# Patient Record
Sex: FEMALE | Race: WHITE | NOT HISPANIC OR LATINO | Employment: PART TIME | URBAN - METROPOLITAN AREA
[De-identification: names, ages, dates, MRNs, and addresses within clinical notes are randomized per-mention and may not be internally consistent; named-entity substitution may affect disease eponyms.]

---

## 2023-05-03 LAB
ALANINE AMINOTRANSFERASE (SGPT) (U/L) IN SER/PLAS: 13 U/L (ref 7–45)
ALBUMIN (G/DL) IN SER/PLAS: 4.5 G/DL (ref 3.4–5)
ALKALINE PHOSPHATASE (U/L) IN SER/PLAS: 56 U/L (ref 33–110)
ANION GAP IN SER/PLAS: 12 MMOL/L (ref 10–20)
ASPARTATE AMINOTRANSFERASE (SGOT) (U/L) IN SER/PLAS: 15 U/L (ref 9–39)
BASOPHILS (10*3/UL) IN BLOOD BY AUTOMATED COUNT: 0.05 X10E9/L (ref 0–0.1)
BASOPHILS/100 LEUKOCYTES IN BLOOD BY AUTOMATED COUNT: 0.9 % (ref 0–2)
BILIRUBIN TOTAL (MG/DL) IN SER/PLAS: 0.5 MG/DL (ref 0–1.2)
C REACTIVE PROTEIN (MG/L) IN SER/PLAS: <0.1 MG/DL
CALCIUM (MG/DL) IN SER/PLAS: 9.6 MG/DL (ref 8.6–10.3)
CARBON DIOXIDE, TOTAL (MMOL/L) IN SER/PLAS: 27 MMOL/L (ref 21–32)
CHLORIDE (MMOL/L) IN SER/PLAS: 103 MMOL/L (ref 98–107)
CREATININE (MG/DL) IN SER/PLAS: 0.71 MG/DL (ref 0.5–1.05)
EOSINOPHILS (10*3/UL) IN BLOOD BY AUTOMATED COUNT: 0.15 X10E9/L (ref 0–0.7)
EOSINOPHILS/100 LEUKOCYTES IN BLOOD BY AUTOMATED COUNT: 2.6 % (ref 0–6)
ERYTHROCYTE DISTRIBUTION WIDTH (RATIO) BY AUTOMATED COUNT: 12.8 % (ref 11.5–14.5)
ERYTHROCYTE MEAN CORPUSCULAR HEMOGLOBIN CONCENTRATION (G/DL) BY AUTOMATED: 33.9 G/DL (ref 32–36)
ERYTHROCYTE MEAN CORPUSCULAR VOLUME (FL) BY AUTOMATED COUNT: 91 FL (ref 80–100)
ERYTHROCYTES (10*6/UL) IN BLOOD BY AUTOMATED COUNT: 4.49 X10E12/L (ref 4–5.2)
GFR FEMALE: >90 ML/MIN/1.73M2
GLUCOSE (MG/DL) IN SER/PLAS: 53 MG/DL (ref 74–99)
HEMATOCRIT (%) IN BLOOD BY AUTOMATED COUNT: 41 % (ref 36–46)
HEMOGLOBIN (G/DL) IN BLOOD: 13.9 G/DL (ref 12–16)
IMMATURE GRANULOCYTES/100 LEUKOCYTES IN BLOOD BY AUTOMATED COUNT: 0.2 % (ref 0–0.9)
LEUKOCYTES (10*3/UL) IN BLOOD BY AUTOMATED COUNT: 5.7 X10E9/L (ref 4.4–11.3)
LYMPHOCYTES (10*3/UL) IN BLOOD BY AUTOMATED COUNT: 1.71 X10E9/L (ref 1.2–4.8)
LYMPHOCYTES/100 LEUKOCYTES IN BLOOD BY AUTOMATED COUNT: 29.9 % (ref 13–44)
MONOCYTES (10*3/UL) IN BLOOD BY AUTOMATED COUNT: 0.54 X10E9/L (ref 0.1–1)
MONOCYTES/100 LEUKOCYTES IN BLOOD BY AUTOMATED COUNT: 9.4 % (ref 2–10)
NEUTROPHILS (10*3/UL) IN BLOOD BY AUTOMATED COUNT: 3.26 X10E9/L (ref 1.2–7.7)
NEUTROPHILS/100 LEUKOCYTES IN BLOOD BY AUTOMATED COUNT: 57 % (ref 40–80)
PLATELETS (10*3/UL) IN BLOOD AUTOMATED COUNT: 229 X10E9/L (ref 150–450)
POTASSIUM (MMOL/L) IN SER/PLAS: 3.8 MMOL/L (ref 3.5–5.3)
PROTEIN TOTAL: 7.1 G/DL (ref 6.4–8.2)
SEDIMENTATION RATE, ERYTHROCYTE: 4 MM/H (ref 0–20)
SODIUM (MMOL/L) IN SER/PLAS: 138 MMOL/L (ref 136–145)
UREA NITROGEN (MG/DL) IN SER/PLAS: 15 MG/DL (ref 6–23)

## 2023-06-28 LAB — HEPATITIS B VIRUS SURFACE AG PRESENCE IN SERUM: NONREACTIVE

## 2023-09-19 PROBLEM — K64.8 BLEEDING INTERNAL HEMORRHOIDS: Status: ACTIVE | Noted: 2023-09-19

## 2023-09-19 PROBLEM — K50.80 CROHN'S DISEASE OF BOTH SMALL AND LARGE INTESTINE WITHOUT COMPLICATION (MULTI): Status: ACTIVE | Noted: 2023-09-19

## 2023-09-19 PROBLEM — K60.30 ANAL FISTULA: Status: ACTIVE | Noted: 2023-09-19

## 2023-09-19 PROBLEM — F90.9 ADHD (ATTENTION DEFICIT HYPERACTIVITY DISORDER): Status: ACTIVE | Noted: 2023-09-19

## 2023-09-19 PROBLEM — D50.8 OTHER IRON DEFICIENCY ANEMIAS: Status: ACTIVE | Noted: 2023-09-19

## 2023-09-19 PROBLEM — F84.5 ASPERGER SYNDROME (HHS-HCC): Status: ACTIVE | Noted: 2023-09-19

## 2023-09-19 PROBLEM — K60.3 ANAL FISTULA: Status: ACTIVE | Noted: 2023-09-19

## 2023-09-19 PROBLEM — D64.9 ABSOLUTE ANEMIA: Status: ACTIVE | Noted: 2023-09-19

## 2023-09-19 PROBLEM — F41.9 ANXIETY DISORDER: Status: ACTIVE | Noted: 2023-09-19

## 2023-09-19 PROBLEM — K62.5 BLEEDING PER RECTUM: Status: ACTIVE | Noted: 2023-09-19

## 2023-09-19 PROBLEM — L29.0 PRURITUS ANI: Status: ACTIVE | Noted: 2023-09-19

## 2023-09-19 PROBLEM — K61.0 ABSCESS, PERIANAL: Status: ACTIVE | Noted: 2023-09-19

## 2023-09-19 RX ORDER — FERROUS SULFATE 325(65) MG
1 TABLET ORAL
COMMUNITY
Start: 2021-03-01 | End: 2024-02-12

## 2023-09-19 RX ORDER — NYSTATIN 100000 U/G
CREAM TOPICAL
COMMUNITY
Start: 2021-08-10

## 2023-09-19 RX ORDER — HYDROCORTISONE ACETATE 25 MG/1
SUPPOSITORY RECTAL
COMMUNITY
Start: 2021-03-01

## 2023-09-19 RX ORDER — FLUOXETINE 10 MG/1
10 TABLET ORAL DAILY
COMMUNITY
Start: 2016-11-07 | End: 2024-05-06 | Stop reason: SDUPTHER

## 2023-09-19 RX ORDER — MESALAMINE 4 G/60ML
4 SUSPENSION RECTAL NIGHTLY
COMMUNITY
Start: 2022-03-30 | End: 2024-05-15 | Stop reason: ALTCHOICE

## 2023-09-19 RX ORDER — METHYLPHENIDATE HYDROCHLORIDE 10 MG/1
1 CAPSULE, EXTENDED RELEASE ORAL EVERY MORNING
COMMUNITY
Start: 2020-11-20 | End: 2024-02-22 | Stop reason: SDUPTHER

## 2023-09-19 RX ORDER — MESALAMINE 1000 MG/1
SUPPOSITORY RECTAL
COMMUNITY
Start: 2022-09-28

## 2023-09-25 DIAGNOSIS — K50.80 CROHN'S DISEASE OF BOTH SMALL AND LARGE INTESTINE WITHOUT COMPLICATION (MULTI): ICD-10-CM

## 2023-09-25 DIAGNOSIS — K50.80 CROHN'S DISEASE OF BOTH SMALL AND LARGE INTESTINE WITHOUT COMPLICATION (MULTI): Primary | ICD-10-CM

## 2023-09-25 RX ORDER — METHYLPREDNISOLONE SODIUM SUCCINATE 40 MG/ML
40 INJECTION INTRAMUSCULAR; INTRAVENOUS AS NEEDED
Status: CANCELLED | OUTPATIENT
Start: 2023-10-18

## 2023-09-25 RX ORDER — FAMOTIDINE 10 MG/ML
20 INJECTION INTRAVENOUS ONCE AS NEEDED
Status: CANCELLED | OUTPATIENT
Start: 2023-10-18

## 2023-09-25 RX ORDER — ALBUTEROL SULFATE 0.83 MG/ML
3 SOLUTION RESPIRATORY (INHALATION) AS NEEDED
Status: CANCELLED | OUTPATIENT
Start: 2023-10-18

## 2023-09-25 RX ORDER — DIPHENHYDRAMINE HYDROCHLORIDE 50 MG/ML
50 INJECTION INTRAMUSCULAR; INTRAVENOUS AS NEEDED
Status: CANCELLED | OUTPATIENT
Start: 2023-10-18

## 2023-09-25 RX ORDER — EPINEPHRINE 0.3 MG/.3ML
0.3 INJECTION SUBCUTANEOUS EVERY 5 MIN PRN
Status: CANCELLED | OUTPATIENT
Start: 2023-10-18

## 2023-10-18 ENCOUNTER — OFFICE VISIT (OUTPATIENT)
Dept: GASTROENTEROLOGY | Facility: CLINIC | Age: 28
End: 2023-10-18
Payer: MEDICAID

## 2023-10-18 ENCOUNTER — INFUSION (OUTPATIENT)
Dept: INFUSION THERAPY | Facility: CLINIC | Age: 28
End: 2023-10-18
Payer: MEDICAID

## 2023-10-18 VITALS
SYSTOLIC BLOOD PRESSURE: 101 MMHG | WEIGHT: 139.7 LBS | DIASTOLIC BLOOD PRESSURE: 64 MMHG | BODY MASS INDEX: 27.43 KG/M2 | HEART RATE: 78 BPM | HEIGHT: 60 IN

## 2023-10-18 VITALS
OXYGEN SATURATION: 99 % | RESPIRATION RATE: 16 BRPM | WEIGHT: 139.77 LBS | SYSTOLIC BLOOD PRESSURE: 114 MMHG | BODY MASS INDEX: 27.3 KG/M2 | DIASTOLIC BLOOD PRESSURE: 73 MMHG | HEART RATE: 75 BPM | TEMPERATURE: 98 F

## 2023-10-18 DIAGNOSIS — K50.80 CROHN'S DISEASE OF BOTH SMALL AND LARGE INTESTINE WITHOUT COMPLICATION (MULTI): ICD-10-CM

## 2023-10-18 DIAGNOSIS — K50.80 CROHN'S DISEASE OF BOTH SMALL AND LARGE INTESTINE WITHOUT COMPLICATION (MULTI): Primary | ICD-10-CM

## 2023-10-18 PROCEDURE — 99213 OFFICE O/P EST LOW 20 MIN: CPT | Performed by: REGISTERED NURSE

## 2023-10-18 PROCEDURE — 36415 COLL VENOUS BLD VENIPUNCTURE: CPT

## 2023-10-18 PROCEDURE — 96413 CHEMO IV INFUSION 1 HR: CPT | Performed by: NURSE PRACTITIONER

## 2023-10-18 PROCEDURE — 86481 TB AG RESPONSE T-CELL SUSP: CPT

## 2023-10-18 RX ORDER — DIPHENHYDRAMINE HYDROCHLORIDE 50 MG/ML
50 INJECTION INTRAMUSCULAR; INTRAVENOUS AS NEEDED
Status: CANCELLED | OUTPATIENT
Start: 2023-12-13

## 2023-10-18 RX ORDER — ALBUTEROL SULFATE 0.83 MG/ML
3 SOLUTION RESPIRATORY (INHALATION) AS NEEDED
Status: CANCELLED | OUTPATIENT
Start: 2023-12-13

## 2023-10-18 RX ORDER — EPINEPHRINE 0.3 MG/.3ML
0.3 INJECTION SUBCUTANEOUS EVERY 5 MIN PRN
Status: CANCELLED | OUTPATIENT
Start: 2023-12-13

## 2023-10-18 RX ORDER — FAMOTIDINE 10 MG/ML
20 INJECTION INTRAVENOUS ONCE AS NEEDED
Status: CANCELLED | OUTPATIENT
Start: 2023-12-13

## 2023-10-18 ASSESSMENT — ENCOUNTER SYMPTOMS
UNEXPECTED WEIGHT CHANGE: 0
ENDOCRINE NEGATIVE: 1
EYE PAIN: 0
APPETITE CHANGE: 0
SHORTNESS OF BREATH: 0
EYES NEGATIVE: 1
DIFFICULTY URINATING: 0
COUGH: 0
ARTHRALGIAS: 0
NERVOUS/ANXIOUS: 0
MYALGIAS: 0
GASTROINTESTINAL NEGATIVE: 1
JOINT SWELLING: 0

## 2023-10-18 NOTE — PATIENT INSTRUCTIONS
Today you received:   INFLECTRA 600 MG IV INFUSION.    For:    1. Crohn's disease of both small and large intestine without complication (CMS/HCC)       NEXT APPT 8 WEEKS     Please read the  Medication Guide that was given to you and reviewed during todays visit.     (Tell all doctors including dentists that you are taking this medication)     Go to the emergency room or call 911 if:  -You have signs of allergic reaction:   o         Rash, hives, itching.   o         Swollen, blistered, peeling skin.   o         Swelling of face, lips, mouth, tongue or throat.   o         Tightness of chest, trouble breathing, swallowing or talking      Call your doctor:     - If IV / injection site gets red, warm, swollen, itchy or leaks fluid or pus.     (Leave dressing on your IV site for at least 2 hours and keep area clean and dry    - If you get sick or have symptoms of infection or are not feeling well for any reason.    (Wash your hands often, stay away from people who are sick)    - If you have side effects from your medication that do not go away or are bothersome.     (Refer to the teaching your nurse gave you for side effects to call your doctor about)     Common side effects may include:  stuffy nose, headache, feeling tired, muscle aches, upset stomach    - Before receiving any vaccines, Call the [facility] at [phone]     - You get sick, are on antibiotics, have had a recent vaccine, have surgery or dental work and your doctor wants your visit rescheduled.    - You need to cancel and reschedule your visit for any reason. Call at least 2 days before your visit if you need to cancel.     - Your insurance changes before your next visit.    (We will need to get approval from your new insurance. This can take up to two weeks.)     [facility] is opened Monday thru Friday 8am-8pm and Saturday 8am-4:30pm. We are closed on holidays.     Voice mail will take your call if the center is  closed. If you leave a message please allow 24 hours for a call back during weekdays. If you leave a message on a weekend/holiday, we will call you back the next business day.       Note authored and patient cared for by:  Ann Cohen RN    Note/Encounter reviewed by: TERESA Haro, DONAP-C. This provider on site at time of patient infusion. Infusion staff to notify this provider of any questions, concerns, abnormals or issues during infusion. No issues reported. Pt. tolerated infusion without difficulty. Pt. not independently evaluated by this provider during today's encounter.

## 2023-10-18 NOTE — PROGRESS NOTES
REASON FOR VISIT: Follow up Crohn's doing well. No blood or mucus. Moves bowels 1-3 times per day.       HPI:  Ethel Deleon is a 28 y.o. female who presents for follow-up for Crohn's disease.    Patient was diagnosed with Crohn's disease affecting the small and large bowel in 2020.  She has a history of perianal fistula.  Started Inflectra in November 2020.  Her dose was increased to 10 mg/kg in April 2021.  Last seen by Dr. Cedeño in the GI clinic in April 2023.  Patient underwent a colonoscopy in June 2023.    She feels well and moves her bowels usually 1-3 times per day.  She saw blood on the toilet tissue and in the toilet a few times in September.  Did not need to use hydrocortisone rectal suppository.    Denies abdominal pain, rectal pain, nausea, vomiting, reflux or dysphagia.  Her weight and appetite are stable.  Denies eye pain, visual changes, joint pain or swelling, oral sores, anal sores, skin rashes, lesions or painful nodules.    Med list notable for  -Inflectra 10 mg/kg every 8 weeks    Labs notable for -normal CBC, CMP, ESR and CRP in May 2023    No fhx of CRC.     Prev endoscopic eval:     >> Colonoscopy-June 2023-Dr. Cedeño  -simple endoscopic score for Crohn's disease-0, mucosal inflammatory changes secondary to quiescent Crohn's disease in the rectum.  External hemorrhoids.  Repeat colonoscopy in 3 years-June 2026    REVIEW OF SYSTEMS    Review of Systems   Constitutional:  Negative for appetite change and unexpected weight change.   HENT:  Negative for mouth sores.    Eyes: Negative.  Negative for pain and visual disturbance.   Respiratory:  Negative for cough and shortness of breath.    Cardiovascular:  Negative for chest pain.   Endocrine: Negative.    Genitourinary:  Negative for difficulty urinating.   Musculoskeletal:  Negative for arthralgias, joint swelling and myalgias.   Skin:  Negative for rash.   Allergic/Immunologic: Negative for environmental allergies and food allergies.    Psychiatric/Behavioral:  The patient is not nervous/anxious.           No Known Allergies    Past Medical History:   Diagnosis Date    Other conditions influencing health status 09/15/2020    No significant past medical history       Past Surgical History:   Procedure Laterality Date    OTHER SURGICAL HISTORY  08/23/2018    Complex / Multiple Fistula-in-ano Repair With Placement Of [place]       Family History   Problem Relation Name Age of Onset    No Known Problems Mother      No Known Problems Father         Social History     Tobacco Use    Smoking status: Not on file    Smokeless tobacco: Not on file   Substance Use Topics    Alcohol use: Not on file       Current Outpatient Medications   Medication Sig Dispense Refill    ferrous sulfate 325 (65 Fe) MG tablet Take 1 tablet (325 mg) by mouth 2 times a day with meals.      FLUoxetine (PROzac) 10 mg tablet Take 1 tablet (10 mg) by mouth once daily.      hydrocortisone (Anusol-HC) 25 mg suppository Insert into the rectum. at bedtime for 2 weeks, then every other night for 2 weeks, then use as needed      inFLIXimab-dyyb (Inflectra) 100 mg injection Inflectra 100 MG Intravenous Solution Reconstituted   Refills: 0        Start : 13-Apr-2021   Active      inFLIXimab-dyyb (Inflectra) 100 mg injection Infuse 620 mg (10 mg/kg) into a venous catheter every 8 (eight) weeks. 620 mg 3    mesalamine (Canasa) 1,000 mg suppository Insert into the rectum. UNWRAP AND INSERT ONE SUPPOSITORY RECTALLY EVERY NIGHT AT BEDTIME FOR3 WEEKS THEN EVERY OTHER BEDTIME AS NEEDED      mesalamine (Rowasa) 4 gram/60 mL enema Insert 60 mL (4 g) into the rectum once daily at bedtime.      methylphenidate CD (Metadate CD) 10 mg daily capsule Take 1 capsule (10 mg) by mouth once daily in the morning.      nystatin (Mycostatin) cream Nystatin [id number] UNIT/GM External Cream   Quantity: 30  Refills: 0        Start : 10-Aug-2021   Active       No current facility-administered medications for this visit.        PHYSICAL EXAM:  /64   Pulse 78   Ht 1.524 m (5')   Wt 63.4 kg (139 lb 11.2 oz)   BMI 27.28 kg/m²      Physical Exam  Constitutional:       Appearance: Normal appearance.   HENT:      Head: Normocephalic and atraumatic.      Nose: Nose normal.   Eyes:      Pupils: Pupils are equal, round, and reactive to light.   Cardiovascular:      Rate and Rhythm: Normal rate and regular rhythm.   Pulmonary:      Effort: Pulmonary effort is normal.      Breath sounds: Normal breath sounds.   Abdominal:      General: Abdomen is flat. Bowel sounds are normal. There is no distension.      Palpations: Abdomen is soft. There is no mass.      Tenderness: There is no abdominal tenderness. There is no guarding or rebound.      Hernia: No hernia is present.   Genitourinary:     Rectum: Normal.   Musculoskeletal:         General: Normal range of motion.      Cervical back: Normal range of motion and neck supple.   Skin:     General: Skin is warm and dry.   Neurological:      Mental Status: She is alert and oriented to person, place, and time.   Psychiatric:         Mood and Affect: Mood normal.         Behavior: Behavior normal.          ASSESSMENT    6-month follow-up for Crohn's disease    PLAN    Continue Inflectra infusions 10 mg/kg every 8 weeks.  Due for infusion today  Check TB blood test in November 2023  Follow-up with Dr. Cedeño in the GI clinic in 6 months.  The office will contact you to schedule you for a follow-up appointment              Date: 10/18/2023  Time: 9:13 AM

## 2023-10-18 NOTE — PROGRESS NOTES
[facility]   infusion Clinic Note   Date: 2023   Name: Ethel Deleon  : 1995   MRN: [id number]         Reason for Visit: OP Infusion (INFLECTRA 600 MG)      Accompanied by:Parent   Visit Type:: Infusion   Diagnosis: Crohn's disease of both small and large intestine without complication (CMS/HCC)    Allergies:   Allergies as of 10/18/2023    (No Known Allergies)      Current Meds has a current medication list which includes the following prescription(s): ferrous sulfate, fluoxetine, hydrocortisone, infliximab-dyyb, infliximab-dyyb, mesalamine, mesalamine, methylphenidate cd, and nystatin.        Vitals:  Vitals:    10/18/23 0948   BP: 122/70   Pulse: 81   Resp: 16   Temp: 35.9 °C (96.7 °F)   SpO2: 97%   Weight: 63.4 kg (139 lb 12.4 oz)      Infusion Pre-procedure Checklist   Allergies reviewed: yes   Medications reviewed: no   Contraindications to treatment:No   Previous reaction to current treatment:No   Current Health Issues: None   Pain: '    Is the pain different from normal: No   Is the pain tolerable: n/a   Is your Doctor aware: n/a   Contraindications based on patient history: No   Provider notified: Not applicable   Labs: T SPOT   Fall Risk Screening: Tracey Fall Risk  History of Falling, Immediate or Within 3 Months: No  Secondary Diagnosis: No  Ambulatory Aid: Walks without aid/bedrest/nurse assist  Gait/Transferring: Normal/bedrest/immobile  Mental Status: Oriented to own ability    Review of Systems   Gastrointestinal: Negative.       Negative for complaint: [x] all other systems have been reviewed and are negative for complaint   Infusion Readiness:   Assessment Concerns Related to Infusion: Yes  Provider notified: n/a  Assess patient for the concerns below. Document provider notification as appropriate:  - Does not meet criteria to treat N/A  - Has an active or recent infection with/without current antibiotic use No  - Has recent/planned dental work No  -  Has recent/planned surgeries No  - Has recently received or plans to receive vaccinations No  - Has treatment related toxicities No  - Is pregnant (unless noted otherwise) No    Initiated By: Ann Cohen RN   Time: 10:49 AM     We administered inFLIXimab-dyyb (Inflectra) 600 mg in sodium chloride 0.9% 250 mL IV*.

## 2023-10-18 NOTE — PATIENT INSTRUCTIONS
ASSESSMENT    6-month follow-up for Crohn's disease    PLAN    Continue Inflectra infusions 10 mg/kg every 8 weeks.  Due for infusion today  Check TB blood test in November 2023  Follow-up with Dr. Cedeño in the GI clinic in 6 months.  The office will contact you to schedule you for a follow-up appointment

## 2023-10-20 LAB
NIL(NEG) CONTROL SPOT COUNT: NORMAL
PANEL A SPOT COUNT: 0
PANEL B SPOT COUNT: 0
POS CONTROL SPOT COUNT: NORMAL
T-SPOT. TB INTERPRETATION: NEGATIVE

## 2023-12-13 ENCOUNTER — INFUSION (OUTPATIENT)
Dept: INFUSION THERAPY | Facility: CLINIC | Age: 28
End: 2023-12-13
Payer: MEDICAID

## 2023-12-13 VITALS
BODY MASS INDEX: 26.69 KG/M2 | WEIGHT: 136.69 LBS | TEMPERATURE: 97.6 F | DIASTOLIC BLOOD PRESSURE: 69 MMHG | OXYGEN SATURATION: 98 % | RESPIRATION RATE: 16 BRPM | HEART RATE: 71 BPM | SYSTOLIC BLOOD PRESSURE: 109 MMHG

## 2023-12-13 DIAGNOSIS — K50.80 CROHN'S DISEASE OF BOTH SMALL AND LARGE INTESTINE WITHOUT COMPLICATION (MULTI): ICD-10-CM

## 2023-12-13 PROCEDURE — 96413 CHEMO IV INFUSION 1 HR: CPT | Performed by: REGISTERED NURSE

## 2023-12-13 RX ORDER — FAMOTIDINE 10 MG/ML
20 INJECTION INTRAVENOUS ONCE AS NEEDED
Status: CANCELLED | OUTPATIENT
Start: 2024-02-07

## 2023-12-13 RX ORDER — ALBUTEROL SULFATE 0.83 MG/ML
3 SOLUTION RESPIRATORY (INHALATION) AS NEEDED
Status: CANCELLED | OUTPATIENT
Start: 2024-02-07

## 2023-12-13 RX ORDER — EPINEPHRINE 0.3 MG/.3ML
0.3 INJECTION SUBCUTANEOUS EVERY 5 MIN PRN
Status: CANCELLED | OUTPATIENT
Start: 2024-02-07

## 2023-12-13 RX ORDER — DIPHENHYDRAMINE HYDROCHLORIDE 50 MG/ML
50 INJECTION INTRAMUSCULAR; INTRAVENOUS AS NEEDED
Status: CANCELLED | OUTPATIENT
Start: 2024-02-07

## 2023-12-13 ASSESSMENT — ENCOUNTER SYMPTOMS
RESPIRATORY NEGATIVE: 1
CARDIOVASCULAR NEGATIVE: 1
CONSTITUTIONAL NEGATIVE: 1
NEUROLOGICAL NEGATIVE: 1

## 2023-12-13 ASSESSMENT — PAIN SCALES - GENERAL: PAINLEVEL: 0-NO PAIN

## 2023-12-13 NOTE — PATIENT INSTRUCTIONS
Today :We administered inFLIXimab-dyyb (Inflectra) 600 mg in sodium chloride 0.9% 250 mL IV*.     For:   1. Crohn's disease of both small and large intestine without complication (CMS/[facility])         Your next appointment is due in:  8 WEEKS        Please read the  Medication Guide that was given to you and reviewed during todays visit.     (Tell all doctors including dentists that you are taking this medication)     Go to the emergency room or call 911 if:  -You have signs of allergic reaction:   -Rash, hives, itching.   -Swollen, blistered, peeling skin.   -Swelling of face, lips, mouth, tongue or throat.   -Tightness of chest, trouble breathing, swallowing or talking     Call your doctor:  - If IV / injection site gets red, warm, swollen, itchy or leaks fluid or pus.     (Leave dressing on your IV site for at least 2 hours and keep area clean and dry  - If you get sick or have symptoms of infection or are not feeling well for any reason.    (Wash your hands often, stay away from people who are sick)  - If you have side effects from your medication that do not go away or are bothersome.     (Refer to the teaching your nurse gave you for side effects to call your doctor about)    - Common side effects may include:  stuffy nose, headache, feeling tired, muscle aches, upset stomach  - Before receiving any vaccines     - Call the [facility] at   If:  - You get sick, are on antibiotics, have had a recent vaccine, have surgery or dental work and your doctor wants your visit rescheduled.  - You need to cancel and reschedule your visit for any reason. Call at least 2 days before your visit if you need to cancel.   - Your insurance changes before your next visit.    (We will need to get approval from your new insurance. This can take up to two weeks.)     [facility] is opened Monday thru Friday. We are closed on weekends and holidays.   Voice mail will take your call if the center is  closed. If you leave a message please allow 24 hours for a call back during weekdays. If you leave a message on a weekend/holiday, we will call you back the next business day.

## 2023-12-13 NOTE — PROGRESS NOTES
[facility]   Infusion Clinic Note   Date: 2023   Name: Ethel Deleon  : 1995   MRN: [id number]         Reason for Visit: OP Infusion (PATIENT HERE FOR INFLECTRA 600 MG INFUSION Q 8 WEEKS)      Accompanied by:Parent   Visit Type:: Infusion   Diagnosis: Crohn's disease of both small and large intestine without complication (CMS/HCC)    Allergies:   Allergies as of 2023   • (No Known Allergies)      Current Meds has a current medication list which includes the following prescription(s): ferrous sulfate (325 mg ferrous sulfate), fluoxetine, hydrocortisone, infliximab-dyyb, infliximab-dyyb, mesalamine, mesalamine, methylphenidate cd, and nystatin.        Vitals:  Vitals:    23 0808   BP: 109/69   Pulse: 71   Resp: 16   Temp: 36.4 °C (97.6 °F)   SpO2: 98%   Weight: 62 kg (136 lb 11 oz)      Infusion Pre-procedure Checklist   Allergies reviewed: yes   Medications reviewed: yes   Contraindications to treatment:Yes   Previous reaction to current treatment:Yes   Current Health Issues: None   Pain: 0-no pain [0]'    Is the pain different from normal: No   Is the pain tolerable: n/a   Is your Doctor aware: n/a   Contraindications based on patient history: No   Provider notified: Not applicable   Labs: Labs reviewed   Fall Risk Screening: Kyung Fall Risk  History of Falling, Immediate or Within 3 Months: No  Secondary Diagnosis: No  Ambulatory Aid: Walks without aid/bedrest/nurse assist  Intravenous Therapy/Heparin Lock: No  Gait/Transferring: Normal/bedrest/immobile  Mental Status: Oriented to own ability  Tracey Fall Risk Score: 0    Review of Systems   Constitutional: Negative.    Respiratory: Negative.     Cardiovascular: Negative.    Gastrointestinal:         PATIENT STATES NORMAL BOWEL MOVEMENTS 1-2 X/DAY   Neurological: Negative.       Negative for complaint: [] all other systems have been reviewed and are negative for complaint   Infusion Readiness:   Assessment  Concerns Related to Infusion: No  Provider notified: n/a  Assess patient for the concerns below. Document provider notification as appropriate:  - Does not meet criteria to treat N/A  - Has an active or recent infection with/without current antibiotic use No  - Has recent/planned dental work No  - Has recent/planned surgeries No  - Has recently received or plans to receive vaccinations No  - Has treatment related toxicities No  - Is pregnant (unless noted otherwise) No    Initiated By: Dorothy Haro RN   Time: 8:33 AM     We administered inFLIXimab-dyyb (Inflectra) 600 mg in sodium chloride 0.9% 250 mL IV*.

## 2024-02-07 ENCOUNTER — INFUSION (OUTPATIENT)
Dept: INFUSION THERAPY | Facility: CLINIC | Age: 29
End: 2024-02-07
Payer: MEDICAID

## 2024-02-07 VITALS
RESPIRATION RATE: 16 BRPM | OXYGEN SATURATION: 99 % | SYSTOLIC BLOOD PRESSURE: 112 MMHG | HEART RATE: 77 BPM | TEMPERATURE: 96.9 F | WEIGHT: 125.88 LBS | DIASTOLIC BLOOD PRESSURE: 52 MMHG | BODY MASS INDEX: 24.58 KG/M2

## 2024-02-07 DIAGNOSIS — K50.80 CROHN'S DISEASE OF BOTH SMALL AND LARGE INTESTINE WITHOUT COMPLICATION (MULTI): ICD-10-CM

## 2024-02-07 PROCEDURE — 96413 CHEMO IV INFUSION 1 HR: CPT | Performed by: REGISTERED NURSE

## 2024-02-07 PROCEDURE — 96415 CHEMO IV INFUSION ADDL HR: CPT | Performed by: REGISTERED NURSE

## 2024-02-07 RX ORDER — FAMOTIDINE 10 MG/ML
20 INJECTION INTRAVENOUS ONCE AS NEEDED
Status: CANCELLED | OUTPATIENT
Start: 2024-04-03

## 2024-02-07 RX ORDER — EPINEPHRINE 0.3 MG/.3ML
0.3 INJECTION SUBCUTANEOUS EVERY 5 MIN PRN
Status: CANCELLED | OUTPATIENT
Start: 2024-04-03

## 2024-02-07 RX ORDER — DIPHENHYDRAMINE HYDROCHLORIDE 50 MG/ML
50 INJECTION INTRAMUSCULAR; INTRAVENOUS AS NEEDED
Status: CANCELLED | OUTPATIENT
Start: 2024-04-03

## 2024-02-07 RX ORDER — ALBUTEROL SULFATE 0.83 MG/ML
3 SOLUTION RESPIRATORY (INHALATION) AS NEEDED
Status: CANCELLED | OUTPATIENT
Start: 2024-04-03

## 2024-02-07 ASSESSMENT — ENCOUNTER SYMPTOMS
NEUROLOGICAL NEGATIVE: 1
RESPIRATORY NEGATIVE: 1
EYES NEGATIVE: 1
CONSTITUTIONAL NEGATIVE: 1
GASTROINTESTINAL NEGATIVE: 1
PSYCHIATRIC NEGATIVE: 1
MUSCULOSKELETAL NEGATIVE: 1
CARDIOVASCULAR NEGATIVE: 1
ENDOCRINE NEGATIVE: 1
HEMATOLOGIC/LYMPHATIC NEGATIVE: 1

## 2024-02-07 ASSESSMENT — PAIN SCALES - GENERAL: PAINLEVEL: 0-NO PAIN

## 2024-02-07 NOTE — PROGRESS NOTES
[facility]   infusion Clinic Note   Date: 2024   Name: Ethel Deleon  : 1995   MRN: [id number]         Reason for Visit: OP Infusion (PATIENT HERE FOR HER INFLECTRA 600MG INFUSION Q8 WEEKS)         Visit Type: INFUSION       Ordered By: JULIA WEBSTER MD      Accompanied by:Parent      Diagnosis: Crohn's disease of both small and large intestine without complication (CMS/HCC)       Allergies:   Allergies as of 2024    (No Known Allergies)         Current Medications has a current medication list which includes the following prescription(s): ferrous sulfate (325 mg ferrous sulfate), fluoxetine, hydrocortisone, infliximab-dyyb, infliximab-dyyb, mesalamine, mesalamine, methylphenidate cd, and nystatin.       Vitals:   Vitals:    24 0821 24 0927 24 1118   BP: 113/73 118/52 112/52   Pulse: 64 62 77   Resp: 18 16 16   Temp: 36.1 °C (96.9 °F)     SpO2: 98% 100% 99%   Weight: 57.1 kg (125 lb 14.1 oz)     PainSc: 0-No pain               Infusion Pre-procedure Checklist:   - Allergies reviewed: yes   - Medications reviewed: yes       - Previous reaction to current treatment: no      Assess patient for the concerns below. Document provider notification as appropriate.  - Active or recent infection with/without current antibiotic use: no  - Recent or planned invasive dental work: no  - Recent or planned surgeries: no  - Recently received or plans to receive vaccinations: no  - Has treatment related toxicities: no  - Is pregnant:  no      Pain: 0   - Is the pain different from normal: n/a   - Is the pain tolerable: n/a   - Is your Doctor aware:  n/a      Labs: N/A         Fall Risk Screening: Kyung Fall Risk  History of Falling, Immediate or Within 3 Months: No  Secondary Diagnosis: No  Ambulatory Aid: Walks without aid/bedrest/nurse assist  Intravenous Therapy/Heparin Lock: No  Gait/Transferring: Normal/bedrest/immobile  Mental Status: Oriented to own  "traci Tracey Fall Risk Score: 0       Review Of Systems:  Review of Systems   Constitutional: Negative.    HENT:  Negative.     Eyes: Negative.    Respiratory: Negative.     Cardiovascular: Negative.    Gastrointestinal: Negative.    Endocrine: Negative.    Genitourinary: Negative.     Musculoskeletal: Negative.    Skin: Negative.    Neurological: Negative.    Hematological: Negative.    Psychiatric/Behavioral: Negative.           Infusion Readiness:   - Assessment Concerns Related to Infusion: No  - Provider notified: n/a      Document Below Only If Indicated:   New Patient Education:    N/A (returning patient for continuation of therapy. Ongoing education provided as needed.)        Treatment Conditions & Drug Specific Questions:    InFLIXimab  (AVSOLA, INFLECTRA, REMICADE, RENFLEXIS)    (Unless otherwise specified on patient specific therapy plan):     TREATMENT CONDITIONS:  Unless otherwise specified on patient specific therapy plan HOLD and notify Provider prior to proceeding with today's infusion if patient with:  o Positive T-Spot  o Reactive Hep B sAg and/or Hep B Core Antibody    Lab Results   Component Value Date    TBSIN Negative 10/18/2023      Lab Results   Component Value Date    HEPBSAG NONREACTIVE 06/28/2023      No results found for: \"NONUHFIRE\", \"NONUHSWGH\", \"NONUHFISH\", \"EXTHEPBSAG\"  Lab Results   Component Value Date    HEPBCAB NONREACTIVE 10/30/2020     Lab Results   Component Value Date    HEPBCAB NONREACTIVE 10/30/2020        Labs reviewed and patient meets treatment conditions? Yes    REMINDER:   WEIGHT BASED DRUG     Recommended Vitals/Observation:  Vitals:     Induction: Obtain vital signs every 30 minutes; at end of observation period and as needed.     Maintenance: Obtain vital signs at start and end of infusions  Observation:     Induction: Patient is monitored for 30 minutes post-infusion     Maintenance: No observation.        Patient's weight today:   Vitals:    02/07/24 0821 "   Weight: 57.1 kg (125 lb 14.1 oz)            Initiated By: Lasha Justice RN   Time: 11:19 AM     We administered inFLIXimab-dyyb (Inflectra) 600 mg in sodium chloride 0.9% 250 mL IV*.

## 2024-02-07 NOTE — PATIENT INSTRUCTIONS
Today you received:  INFLECTRA 600MG INFUSION Q8 WEEKS    For:    1. Crohn's disease of both small and large intestine without complication (CMS/[facility])         Please read the  Medication Guide that was given to you and reviewed during todays visit.     (Tell all doctors including dentists that you are taking this medication)     Go to the emergency room or call 911 if:  -You have signs of allergic reaction:   o         Rash, hives, itching.   o         Swollen, blistered, peeling skin.   o         Swelling of face, lips, mouth, tongue or throat.   o         Tightness of chest, trouble breathing, swallowing or talking      Call your doctor:     - If IV / injection site gets red, warm, swollen, itchy or leaks fluid or pus.     (Leave dressing on your IV site for at least 2 hours and keep area clean and dry    - If you get sick or have symptoms of infection or are not feeling well for any reason.    (Wash your hands often, stay away from people who are sick)    - If you have side effects from your medication that do not go away or are bothersome.     (Refer to the teaching your nurse gave you for side effects to call your doctor about)     Common side effects may include:  stuffy nose, headache, feeling tired, muscle aches, upset stomach    - Before receiving any vaccines, Call the [facility] at [phone]     - You get sick, are on antibiotics, have had a recent vaccine, have surgery or dental work and your doctor wants your visit rescheduled.    - You need to cancel and reschedule your visit for any reason. Call at least 2 days before your visit if you need to cancel.     - Your insurance changes before your next visit.    (We will need to get approval from your new insurance. This can take up to two weeks.)     [facility] is opened Monday thru Friday 8am-8pm and Saturday 8am-4:30pm. We are closed on holidays.     Voice mail will take your call if the center is closed. If you  leave a message please allow 24 hours for a call back during weekdays. If you leave a message on a weekend/holiday, we will call you back the next business day.

## 2024-02-08 DIAGNOSIS — D50.8 OTHER IRON DEFICIENCY ANEMIAS: Primary | ICD-10-CM

## 2024-02-12 RX ORDER — FERROUS SULFATE TAB 325 MG (65 MG ELEMENTAL FE) 325 (65 FE) MG
1 TAB ORAL
Qty: 60 TABLET | Refills: 0 | Status: SHIPPED | OUTPATIENT
Start: 2024-02-12 | End: 2024-04-08

## 2024-02-22 DIAGNOSIS — F90.0 ATTENTION DEFICIT HYPERACTIVITY DISORDER (ADHD), PREDOMINANTLY INATTENTIVE TYPE: Primary | ICD-10-CM

## 2024-02-22 RX ORDER — METHYLPHENIDATE HYDROCHLORIDE 10 MG/1
10 CAPSULE, EXTENDED RELEASE ORAL EVERY MORNING
Qty: 30 CAPSULE | Refills: 0 | Status: SHIPPED | OUTPATIENT
Start: 2024-04-22 | End: 2024-05-07 | Stop reason: SDUPTHER

## 2024-02-22 RX ORDER — METHYLPHENIDATE HYDROCHLORIDE 10 MG/1
10 CAPSULE, EXTENDED RELEASE ORAL EVERY MORNING
Qty: 30 CAPSULE | Refills: 0 | Status: SHIPPED | OUTPATIENT
Start: 2024-03-22 | End: 2024-05-07 | Stop reason: SDUPTHER

## 2024-02-22 RX ORDER — METHYLPHENIDATE HYDROCHLORIDE 10 MG/1
10 CAPSULE, EXTENDED RELEASE ORAL EVERY MORNING
Qty: 30 CAPSULE | Refills: 0 | Status: SHIPPED | OUTPATIENT
Start: 2024-02-22 | End: 2024-02-29 | Stop reason: SDUPTHER

## 2024-02-29 DIAGNOSIS — F90.0 ATTENTION DEFICIT HYPERACTIVITY DISORDER (ADHD), PREDOMINANTLY INATTENTIVE TYPE: ICD-10-CM

## 2024-02-29 RX ORDER — METHYLPHENIDATE HYDROCHLORIDE 10 MG/1
10 CAPSULE, EXTENDED RELEASE ORAL EVERY MORNING
Qty: 30 CAPSULE | Refills: 0 | Status: SHIPPED | OUTPATIENT
Start: 2024-04-29 | End: 2024-05-07 | Stop reason: WASHOUT

## 2024-02-29 RX ORDER — METHYLPHENIDATE HYDROCHLORIDE 10 MG/1
10 CAPSULE, EXTENDED RELEASE ORAL EVERY MORNING
Qty: 30 CAPSULE | Refills: 0 | Status: SHIPPED | OUTPATIENT
Start: 2024-03-30 | End: 2024-05-07 | Stop reason: WASHOUT

## 2024-02-29 RX ORDER — METHYLPHENIDATE HYDROCHLORIDE 10 MG/1
10 CAPSULE, EXTENDED RELEASE ORAL EVERY MORNING
Qty: 30 CAPSULE | Refills: 0 | Status: SHIPPED | OUTPATIENT
Start: 2024-02-29 | End: 2024-05-07 | Stop reason: SDUPTHER

## 2024-04-03 ENCOUNTER — INFUSION (OUTPATIENT)
Dept: INFUSION THERAPY | Facility: CLINIC | Age: 29
End: 2024-04-03
Payer: MEDICAID

## 2024-04-03 ENCOUNTER — DOCUMENTATION (OUTPATIENT)
Dept: INFUSION THERAPY | Facility: CLINIC | Age: 29
End: 2024-04-03

## 2024-04-03 VITALS
OXYGEN SATURATION: 99 % | DIASTOLIC BLOOD PRESSURE: 70 MMHG | SYSTOLIC BLOOD PRESSURE: 112 MMHG | HEART RATE: 62 BPM | TEMPERATURE: 97.1 F | BODY MASS INDEX: 27.45 KG/M2 | RESPIRATION RATE: 16 BRPM | WEIGHT: 140.54 LBS

## 2024-04-03 DIAGNOSIS — K50.80 CROHN'S DISEASE OF BOTH SMALL AND LARGE INTESTINE WITHOUT COMPLICATION (MULTI): ICD-10-CM

## 2024-04-03 PROCEDURE — 96413 CHEMO IV INFUSION 1 HR: CPT | Performed by: NURSE PRACTITIONER

## 2024-04-03 PROCEDURE — 96415 CHEMO IV INFUSION ADDL HR: CPT | Performed by: NURSE PRACTITIONER

## 2024-04-03 RX ORDER — FAMOTIDINE 10 MG/ML
20 INJECTION INTRAVENOUS ONCE AS NEEDED
Status: CANCELLED | OUTPATIENT
Start: 2024-05-29

## 2024-04-03 RX ORDER — DIPHENHYDRAMINE HYDROCHLORIDE 50 MG/ML
50 INJECTION INTRAMUSCULAR; INTRAVENOUS AS NEEDED
Status: CANCELLED | OUTPATIENT
Start: 2024-05-29

## 2024-04-03 RX ORDER — EPINEPHRINE 0.3 MG/.3ML
0.3 INJECTION SUBCUTANEOUS EVERY 5 MIN PRN
Status: CANCELLED | OUTPATIENT
Start: 2024-05-29

## 2024-04-03 RX ORDER — ALBUTEROL SULFATE 0.83 MG/ML
3 SOLUTION RESPIRATORY (INHALATION) AS NEEDED
Status: CANCELLED | OUTPATIENT
Start: 2024-05-29

## 2024-04-03 NOTE — PROGRESS NOTES
"[facility]   Infusion Clinic Note   Date: April 3, 2024   Name: Ethel Deleon  : 1995   MRN: [id number]         Reason for Visit: No chief complaint on file.      Accompanied by:Parent   Visit Type:: Infusion   Diagnosis: Crohn's disease of both small and large intestine without complication (CMS/HCC)    Allergies:   Allergies as of 2024    (No Known Allergies)      Current Meds has a current medication list which includes the following prescription(s): ferosul, fluoxetine, hydrocortisone, infliximab-dyyb, infliximab-dyyb, mesalamine, mesalamine, methylphenidate cd, [START ON 2024] methylphenidate cd, methylphenidate cd, methylphenidate cd, [START ON 2024] methylphenidate cd, and nystatin.        Vitals:  Vitals:    24 0759   BP: 113/73   Pulse: 63   Resp: 16   Temp: 36.8 °C (98.2 °F)   SpO2: 99%   Weight: 63.7 kg (140 lb 8.7 oz)      Infusion Pre-procedure Checklist   Allergies reviewed: yes   Medications reviewed: yes   Contraindications to treatment:No   Previous reaction to current treatment:No   Current Health Issues: None   Pain: '    Is the pain different from normal: No   Is the pain tolerable: n/a   Is your Doctor aware: no   Contraindications based on patient history: No   Provider notified: No   Labs: Labs reviewed   Fall Risk Screening:      Review of Systems   Gastrointestinal:         DENIES ANY \"FLARE UPS\"      Negative for complaint: [x] all other systems have been reviewed and are negative for complaint   Infusion Readiness:   Assessment Concerns Related to Infusion: No  Provider notified: no  Assess patient for the concerns below. Document provider notification as appropriate:  - Does not meet criteria to treat No  - Has an active or recent infection with/without current antibiotic use No  - Has recent/planned dental work No  - Has recent/planned surgeries No  - Has recently received or plans to receive vaccinations No  - Has treatment " "related toxicities No  - Is pregnant (unless noted otherwise) No    WEIGHT TODAY 63.75 KG  DOSE 600 MG          (Unless otherwise specified on patient specific therapy plan):     TREATMENT CONDITIONS:  Unless otherwise specified on patient specific therapy plan HOLD and notify Provider prior to proceeding with today's infusion if patient with:  o Positive T-Spot  o Reactive Hep B sAg and/or Hep B Core Antibody    Lab Results   Component Value Date    TBSIN Negative 10/18/2023      Lab Results   Component Value Date    HEPBSAG NONREACTIVE 06/28/2023      No results found for: \"NONUHFIRE\", \"NONUHSWGH\", \"NONUHFISH\", \"EXTHEPBSAG\"  Lab Results   Component Value Date    HEPBCAB NONREACTIVE 10/30/2020     Lab Results   Component Value Date    HEPBCAB NONREACTIVE 10/30/2020        Labs reviewed and patient meets treatment conditions? Yes    Initiated By: Ann Cohen RN   Time: 8:30 AM     We administered inFLIXimab-dyyb (Inflectra) 600 mg in sodium chloride 0.9% 250 mL IV*.      "

## 2024-04-03 NOTE — PATIENT INSTRUCTIONS
Today you received:     INFLECTRA 600 MG IV INFUSION.    NEXT APPT 8 WEEKS.     For:    1. Crohn's disease of both small and large intestine without complication (CMS/[facility])            Please read the  Medication Guide that was given to you and reviewed during todays visit.     (Tell all doctors including dentists that you are taking this medication)     Go to the emergency room or call 911 if:  -You have signs of allergic reaction:   o         Rash, hives, itching.   o         Swollen, blistered, peeling skin.   o         Swelling of face, lips, mouth, tongue or throat.   o         Tightness of chest, trouble breathing, swallowing or talking      Call your doctor:     - If IV / injection site gets red, warm, swollen, itchy or leaks fluid or pus.     (Leave dressing on your IV site for at least 2 hours and keep area clean and dry    - If you get sick or have symptoms of infection or are not feeling well for any reason.    (Wash your hands often, stay away from people who are sick)    - If you have side effects from your medication that do not go away or are bothersome.     (Refer to the teaching your nurse gave you for side effects to call your doctor about)     Common side effects may include:  stuffy nose, headache, feeling tired, muscle aches, upset stomach    - Before receiving any vaccines, Call the [facility] at [phone]     - You get sick, are on antibiotics, have had a recent vaccine, have surgery or dental work and your doctor wants your visit rescheduled.    - You need to cancel and reschedule your visit for any reason. Call at least 2 days before your visit if you need to cancel.     - Your insurance changes before your next visit.    (We will need to get approval from your new insurance. This can take up to two weeks.)     [facility] is opened Monday thru Friday 8am-8pm and Saturday 8am-4:30pm. We are closed on holidays.     Voice mail will take your call if the  center is closed. If you leave a message please allow 24 hours for a call back during weekdays. If you leave a message on a weekend/holiday, we will call you back the next business day.

## 2024-04-03 NOTE — PROGRESS NOTES
Patient to be scheduled for Continuation of Infliximab infusions - Inflectra infusions  Diagnosis: Crohns   Dosing is weight based at: 10 mg/kg  Dosing weight of: 63.7 kg  For a total dose of: 600 mg every 8 weeks (maintenance)    Modified rapid infusion based on patient tolerability: 100ml/hr x15 min. If tolerated, then can increase to 175ml/hr until complete. Max rate is 175ml/hr     Labs…  TB drawn/results:   Lab Results   Component Value Date    TBSIN Negative 10/18/2023      Hep B SAg drawn/results:   Lab Results   Component Value Date    HEPBSAG NONREACTIVE 06/28/2023      Hep B Core antibody:   Lab Results   Component Value Date    HEPBCAB NONREACTIVE 10/30/2020     Lab Results   Component Value Date    HEPBCAB NONREACTIVE 10/30/2020      CBC drawn: (if available)   Lab Results   Component Value Date    WBC 5.7 05/03/2023    HGB 13.9 05/03/2023    HCT 41.0 05/03/2023    MCV 91 05/03/2023     05/03/2023      CMP drawn: (if available)     Chemistry    Lab Results   Component Value Date/Time     05/03/2023 1352    K 3.8 05/03/2023 1352     05/03/2023 1352    CO2 27 05/03/2023 1352    BUN 15 05/03/2023 1352    CREATININE 0.71 05/03/2023 1352    Lab Results   Component Value Date/Time    CALCIUM 9.6 05/03/2023 1352    ALKPHOS 56 05/03/2023 1352    AST 15 05/03/2023 1352    ALT 13 05/03/2023 1352    BILITOT 0.5 05/03/2023 1352           CRP drawn: (If available)   Lab Results   Component Value Date    CRP <0.10 05/03/2023       Last infusion received: 4/3/24 (if continuation)   Due: 8 WEEKS    This result meets treatment criteria.

## 2024-04-06 DIAGNOSIS — D50.8 OTHER IRON DEFICIENCY ANEMIAS: ICD-10-CM

## 2024-04-08 RX ORDER — FERROUS SULFATE TAB 325 MG (65 MG ELEMENTAL FE) 325 (65 FE) MG
1 TAB ORAL
Qty: 60 TABLET | Refills: 0 | Status: SHIPPED | OUTPATIENT
Start: 2024-04-08 | End: 2024-05-15 | Stop reason: SDUPTHER

## 2024-04-17 DIAGNOSIS — F90.0 ATTENTION DEFICIT HYPERACTIVITY DISORDER (ADHD), PREDOMINANTLY INATTENTIVE TYPE: Primary | ICD-10-CM

## 2024-04-17 RX ORDER — METHYLPHENIDATE HYDROCHLORIDE 10 MG/1
10 TABLET ORAL DAILY
Qty: 30 TABLET | Refills: 0 | Status: SHIPPED | OUTPATIENT
Start: 2024-04-17 | End: 2024-05-07 | Stop reason: SDUPTHER

## 2024-05-06 ENCOUNTER — OFFICE VISIT (OUTPATIENT)
Dept: PEDIATRIC NEUROLOGY | Facility: CLINIC | Age: 29
End: 2024-05-06
Payer: MEDICAID

## 2024-05-06 VITALS
SYSTOLIC BLOOD PRESSURE: 113 MMHG | HEART RATE: 69 BPM | TEMPERATURE: 97.8 F | BODY MASS INDEX: 26.13 KG/M2 | DIASTOLIC BLOOD PRESSURE: 72 MMHG | HEIGHT: 61 IN | WEIGHT: 138.4 LBS

## 2024-05-06 DIAGNOSIS — F41.1 GENERALIZED ANXIETY DISORDER: Primary | ICD-10-CM

## 2024-05-06 DIAGNOSIS — F90.0 ATTENTION DEFICIT HYPERACTIVITY DISORDER (ADHD), PREDOMINANTLY INATTENTIVE TYPE: ICD-10-CM

## 2024-05-06 DIAGNOSIS — F84.5 ASPERGER SYNDROME (HHS-HCC): ICD-10-CM

## 2024-05-06 PROCEDURE — 99213 OFFICE O/P EST LOW 20 MIN: CPT | Performed by: NURSE PRACTITIONER

## 2024-05-06 RX ORDER — FLUOXETINE 10 MG/1
20 TABLET ORAL DAILY
Qty: 60 TABLET | Refills: 11 | Status: SHIPPED | OUTPATIENT
Start: 2024-05-06 | End: 2025-05-06

## 2024-05-06 NOTE — PATIENT INSTRUCTIONS
You are doing quite well. 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 Mood is good and you have been managing your anxiety. You are able to focus with your ADHD treatment. Headaches seemed to be linked to menstrual cycle or weather front changes.  I have talked with you about the followin. Please continue with current Metadate dose. Refills will be provided. I will also send refills for the immediate release due to production issues on the Metadate CD.   2. Please continue with current Prozac dose. Refills will be provided.   3. Please monitor anxiety.   4. Please call with an update on you progress.  5. Follow-up will be in one year.  6. Stimulant use discussed.

## 2024-05-06 NOTE — PROGRESS NOTES
Laine Deleon is a 28 y.o.   female.  MELYSSA Davenport is an almost 29 year old young woman with Asperger syndrome, ADHD and anxiety. She was last seen in May, 2023.    She is still working at [employer] and she is working 4 days a week, and on Thursdays she is attending art class. 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 She has been at [employer] 5 years.     She continues to live upstairs at her mom's and she like her autonomy.     She continues to help her mom around the house and they do fun things together.   This Saturday, she is going to attend her highschool reunion, she tells me that she is nervous about going and that people would not recognize her.     Her Penny's is improving, and she just had a colonoscopy and does not have to repeat for 3 years. Continues with Remicade every 8 weeks.     She is still taking Fluoxetine 20 mg daily and Metadate CD 10 mg daily for work. No medication side effects   are reported.    Anxiety is in a good place and she is seeing her therapist every two months.     A review of systems finds no other pertinent positives.     Objective   Neurological Exam  Mental Status   Oriented to person, place and time. Recent and remote memory are intact. Speech is normal. Language is fluent with no aphasia.  Today's exam finds a pleasant young woman in no acute distress. .    Cranial Nerves  CN II: Visual fields full to confrontation.  CN III, IV, VI: Extraocular movements intact bilaterally. Pupils equal round and reactive to light bilaterally.  CN V: Facial sensation is normal.  CN VII: Full and symmetric facial movement.  CN VIII: Hearing is normal.  CN IX, X: Palate elevates symmetrically  CN XI: Shoulder shrug strength is normal.  CN XII: Tongue midline without atrophy or fasciculations.    Motor  Normal muscle bulk throughout. Normal muscle tone. Strength is 5/5 throughout all four extremities.    Sensory  Light touch is normal in upper and  lower extremities.     Reflexes                                            Right                      Left  Brachioradialis                    2+                         2+  Biceps                                 2+                         2+  Patellar                                2+                         2+  Achilles                                2+                         2+    Coordination  Right: Rapid alternating movement normal.Left: Rapid alternating movement normal.    Gait  Casual gait is normal including stance, stride, and arm swing.    Physical Exam  Eyes:      Extraocular Movements: Extraocular movements intact.      Pupils: Pupils are equal, round, and reactive to light.   Neurological:      Motor: Motor strength is normal.     Deep Tendon Reflexes:      Reflex Scores:       Bicep reflexes are 2+ on the right side and 2+ on the left side.       Brachioradialis reflexes are 2+ on the right side and 2+ on the left side.       Patellar reflexes are 2+ on the right side and 2+ on the left side.       Achilles reflexes are 2+ on the right side and 2+ on the left side.  Psychiatric:         Speech: Speech normal.         Assessment/Plan   You are doing quite well. 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 Mood is good and you have been managing your anxiety. You are able to focus with your ADHD treatment. Headaches seemed to be linked to menstrual cycle or weather front changes.  I have talked with you about the followin. Please continue with current Metadate dose. Refills will be provided. I will also send refills for the immediate release due to production issues on the Metadate CD.   2. Please continue with current Prozac dose. Refills will be provided.   3. Please monitor anxiety.   4. Please call with an update on you progress.  5. Follow-up will be in one year.  6. Stimulant use discussed.

## 2024-05-07 RX ORDER — METHYLPHENIDATE HYDROCHLORIDE 10 MG/1
10 CAPSULE, EXTENDED RELEASE ORAL EVERY MORNING
Qty: 30 CAPSULE | Refills: 0 | Status: SHIPPED | OUTPATIENT
Start: 2024-07-07

## 2024-05-07 RX ORDER — METHYLPHENIDATE HYDROCHLORIDE 10 MG/1
10 TABLET ORAL DAILY
Qty: 30 TABLET | Refills: 0 | Status: SHIPPED | OUTPATIENT
Start: 2024-05-07 | End: 2024-06-06

## 2024-05-07 RX ORDER — METHYLPHENIDATE HYDROCHLORIDE 10 MG/1
10 CAPSULE, EXTENDED RELEASE ORAL EVERY MORNING
Qty: 30 CAPSULE | Refills: 0 | Status: SHIPPED | OUTPATIENT
Start: 2024-06-07

## 2024-05-07 RX ORDER — METHYLPHENIDATE HYDROCHLORIDE 10 MG/1
10 CAPSULE, EXTENDED RELEASE ORAL EVERY MORNING
Qty: 30 CAPSULE | Refills: 0 | Status: SHIPPED | OUTPATIENT
Start: 2024-05-07

## 2024-05-15 ENCOUNTER — OFFICE VISIT (OUTPATIENT)
Dept: PRIMARY CARE | Facility: CLINIC | Age: 29
End: 2024-05-15
Payer: MEDICAID

## 2024-05-15 VITALS
HEIGHT: 61 IN | WEIGHT: 135 LBS | SYSTOLIC BLOOD PRESSURE: 110 MMHG | DIASTOLIC BLOOD PRESSURE: 62 MMHG | HEART RATE: 68 BPM | OXYGEN SATURATION: 98 % | BODY MASS INDEX: 25.49 KG/M2

## 2024-05-15 DIAGNOSIS — Z00.00 ANNUAL PHYSICAL EXAM: Primary | ICD-10-CM

## 2024-05-15 DIAGNOSIS — F90.9 ATTENTION DEFICIT HYPERACTIVITY DISORDER (ADHD), UNSPECIFIED ADHD TYPE: Chronic | ICD-10-CM

## 2024-05-15 DIAGNOSIS — D50.8 OTHER IRON DEFICIENCY ANEMIAS: ICD-10-CM

## 2024-05-15 DIAGNOSIS — F84.5 ASPERGER SYNDROME (HHS-HCC): Chronic | ICD-10-CM

## 2024-05-15 DIAGNOSIS — K50.80 CROHN'S DISEASE OF BOTH SMALL AND LARGE INTESTINE WITHOUT COMPLICATION (MULTI): Chronic | ICD-10-CM

## 2024-05-15 PROBLEM — R19.7 DIARRHEA: Status: ACTIVE | Noted: 2018-02-28

## 2024-05-15 PROBLEM — K61.1 PERIRECTAL ABSCESS: Status: ACTIVE | Noted: 2018-02-28

## 2024-05-15 PROBLEM — F84.0 AUTISM (HHS-HCC): Status: ACTIVE | Noted: 2018-02-28

## 2024-05-15 PROBLEM — D50.9 IRON DEFICIENCY ANEMIA: Status: ACTIVE | Noted: 2021-08-10

## 2024-05-15 PROCEDURE — 99395 PREV VISIT EST AGE 18-39: CPT | Performed by: STUDENT IN AN ORGANIZED HEALTH CARE EDUCATION/TRAINING PROGRAM

## 2024-05-15 PROCEDURE — 1036F TOBACCO NON-USER: CPT | Performed by: STUDENT IN AN ORGANIZED HEALTH CARE EDUCATION/TRAINING PROGRAM

## 2024-05-15 RX ORDER — FERROUS SULFATE 325(65) MG
1 TABLET ORAL DAILY
Qty: 90 TABLET | Refills: 3 | Status: SHIPPED | OUTPATIENT
Start: 2024-05-15

## 2024-05-15 ASSESSMENT — ENCOUNTER SYMPTOMS
PALPITATIONS: 0
LIGHT-HEADEDNESS: 0
DYSPHORIC MOOD: 0
SINUS PRESSURE: 0
DIARRHEA: 0
DIFFICULTY URINATING: 0
HEADACHES: 0
NERVOUS/ANXIOUS: 0
WHEEZING: 0
ABDOMINAL PAIN: 0
FATIGUE: 0
DIZZINESS: 0
CHEST TIGHTNESS: 0
CONSTIPATION: 0
UNEXPECTED WEIGHT CHANGE: 0
SLEEP DISTURBANCE: 0
SHORTNESS OF BREATH: 0

## 2024-05-15 ASSESSMENT — PATIENT HEALTH QUESTIONNAIRE - PHQ9
SUM OF ALL RESPONSES TO PHQ9 QUESTIONS 1 AND 2: 0
1. LITTLE INTEREST OR PLEASURE IN DOING THINGS: NOT AT ALL
2. FEELING DOWN, DEPRESSED OR HOPELESS: NOT AT ALL

## 2024-05-15 NOTE — PROGRESS NOTES
"Subjective   Patient ID: Ethel Deleon \"Rachel" is a 28 y.o. female who presents for Establish Care.  Establish care.     Hx of ADHD. Takes methylphenidate for work. Works at [employer]. No issues with sleep.     Hx of Crohns. Remicade infusion every 8 weeks. Bowels have been stable. Up to date on colonoscopy. Due back in 3 years.     Periods are regular and monthly.     No breathing issues.     Occasional sinus headaches.     No other concerns today.             Review of Systems   Constitutional:  Negative for fatigue and unexpected weight change.   HENT:  Negative for congestion, ear pain and sinus pressure.    Eyes:  Negative for visual disturbance.   Respiratory:  Negative for chest tightness, shortness of breath and wheezing.    Cardiovascular:  Negative for chest pain, palpitations and leg swelling.   Gastrointestinal:  Negative for abdominal pain, constipation and diarrhea.   Genitourinary:  Negative for difficulty urinating.   Skin:  Negative for rash.   Neurological:  Negative for dizziness, light-headedness and headaches.   Psychiatric/Behavioral:  Negative for dysphoric mood and sleep disturbance. The patient is not nervous/anxious.    All other systems reviewed and are negative.      Objective   Physical Exam  Vitals reviewed.   Constitutional:       General: She is not in acute distress.  HENT:      Head: Normocephalic.      Right Ear: External ear normal.      Left Ear: External ear normal.      Nose: Nose normal.   Eyes:      Extraocular Movements: Extraocular movements intact.      Pupils: Pupils are equal, round, and reactive to light.   Cardiovascular:      Rate and Rhythm: Normal rate and regular rhythm.      Heart sounds: Normal heart sounds.   Pulmonary:      Effort: Pulmonary effort is normal.      Breath sounds: Normal breath sounds.   Abdominal:      Palpations: Abdomen is soft.      Tenderness: There is no abdominal tenderness. There is no guarding.   Musculoskeletal:         General: " Normal range of motion.      Cervical back: Normal range of motion and neck supple.   Skin:     General: Skin is warm and dry.   Neurological:      Mental Status: She is alert. Mental status is at baseline.   Psychiatric:         Mood and Affect: Mood normal.         Behavior: Behavior normal.         Body mass index is 25.68 kg/m².      Current Outpatient Medications:     FLUoxetine (PROzac) 10 mg tablet, Take 2 tablets (20 mg) by mouth once daily., Disp: 60 tablet, Rfl: 11    hydrocortisone (Anusol-HC) 25 mg suppository, Insert into the rectum. at bedtime for 2 weeks, then every other night for 2 weeks, then use as needed, Disp: , Rfl:     inFLIXimab-dyyb (Inflectra) 100 mg injection, Inflectra 100 MG Intravenous Solution Reconstituted  Refills: 0      Start : 13-Apr-2021  Active, Disp: , Rfl:     inFLIXimab-dyyb (Inflectra) 100 mg injection, Infuse 620 mg (10 mg/kg) into a venous catheter every 8 (eight) weeks., Disp: 620 mg, Rfl: 3    mesalamine (Canasa) 1,000 mg suppository, Insert into the rectum. UNWRAP AND INSERT ONE SUPPOSITORY RECTALLY EVERY NIGHT AT BEDTIME FOR3 WEEKS THEN EVERY OTHER BEDTIME AS NEEDED, Disp: , Rfl:     methylphenidate (Ritalin) 10 mg tablet, Take 1 tablet (10 mg) by mouth once daily., Disp: 30 tablet, Rfl: 0    methylphenidate CD (Metadate CD) 10 mg daily capsule, Take 1 capsule (10 mg) by mouth once daily in the morning., Disp: 30 capsule, Rfl: 0    ferrous sulfate, 325 mg ferrous sulfate, (FeroSuL) tablet, Take 1 tablet by mouth once daily., Disp: 90 tablet, Rfl: 3    [START ON 6/7/2024] methylphenidate CD (Metadate CD) 10 mg daily capsule, Take 1 capsule (10 mg) by mouth once daily in the morning. Do not fill before June 7, 2024., Disp: 30 capsule, Rfl: 0    [START ON 7/7/2024] methylphenidate CD (Metadate CD) 10 mg daily capsule, Take 1 capsule (10 mg) by mouth once daily in the morning. Do not fill before July 7, 2024., Disp: 30 capsule, Rfl: 0    nystatin (Mycostatin) cream,  Nystatin [id number] UNIT/GM External Cream  Quantity: 30  Refills: 0      Start : 10-Aug-2021  Active, Disp: , Rfl:       Assessment/Plan   Diagnoses and all orders for this visit:  Annual physical exam  Comments:  UTD on gyn exam  Other iron deficiency anemias  -     ferrous sulfate, 325 mg ferrous sulfate, (FeroSuL) tablet; Take 1 tablet by mouth once daily.  Crohn's disease of both small and large intestine without complication (Multi)  Comments:  stable,follows with GI  due for colonoscopy in 3 years  Asperger syndrome ([facility])  Attention deficit hyperactivity disorder (ADHD), unspecified ADHD type  Comments:  stable, follows with specialist    Follow up annually       Sharona Gaona DO 05/15/24 10:26 AM

## 2024-05-23 ENCOUNTER — TELEPHONE (OUTPATIENT)
Dept: GASTROENTEROLOGY | Facility: CLINIC | Age: 29
End: 2024-05-23
Payer: MEDICAID

## 2024-05-23 DIAGNOSIS — K50.80 CROHN'S DISEASE OF BOTH SMALL AND LARGE INTESTINE WITHOUT COMPLICATION (MULTI): ICD-10-CM

## 2024-05-23 NOTE — TELEPHONE ENCOUNTER
----- Message from Dimple WEBSTER MD sent at 5/23/2024  3:38 PM EDT -----  Regarding: FW: Inflectra Rx renewal needed    ----- Message -----  From: Kym Newsome, PharmD  Sent: 5/22/2024   1:48 PM EDT  To: Dimple WEBSTER MD; Shaye Arreguin [facility]  Subject: Inflectra Rx renewal needed                      Good afternoon Dr. Cedeño!    We are in need of a Rx renewal for Ms. Deleon's Inflectra 600mg q8 weeks...     Would you be able to send over a renewal (with 3 refills again?) to  home infusion pharmacy at your earliest convenience?    Thank you so much!!    Rosy jarett

## 2024-05-29 ENCOUNTER — INFUSION (OUTPATIENT)
Dept: INFUSION THERAPY | Facility: CLINIC | Age: 29
End: 2024-05-29
Payer: MEDICAID

## 2024-05-29 VITALS
RESPIRATION RATE: 16 BRPM | OXYGEN SATURATION: 95 % | SYSTOLIC BLOOD PRESSURE: 115 MMHG | DIASTOLIC BLOOD PRESSURE: 69 MMHG | BODY MASS INDEX: 25.96 KG/M2 | TEMPERATURE: 97.9 F | HEART RATE: 58 BPM | WEIGHT: 136.47 LBS

## 2024-05-29 DIAGNOSIS — K50.80 CROHN'S DISEASE OF BOTH SMALL AND LARGE INTESTINE WITHOUT COMPLICATION (MULTI): ICD-10-CM

## 2024-05-29 PROCEDURE — 96365 THER/PROPH/DIAG IV INF INIT: CPT | Performed by: REGISTERED NURSE

## 2024-05-29 RX ORDER — ALBUTEROL SULFATE 0.83 MG/ML
3 SOLUTION RESPIRATORY (INHALATION) AS NEEDED
OUTPATIENT
Start: 2024-07-24

## 2024-05-29 RX ORDER — DIPHENHYDRAMINE HYDROCHLORIDE 50 MG/ML
50 INJECTION INTRAMUSCULAR; INTRAVENOUS AS NEEDED
OUTPATIENT
Start: 2024-07-24

## 2024-05-29 RX ORDER — EPINEPHRINE 0.3 MG/.3ML
0.3 INJECTION SUBCUTANEOUS EVERY 5 MIN PRN
OUTPATIENT
Start: 2024-07-24

## 2024-05-29 RX ORDER — FAMOTIDINE 10 MG/ML
20 INJECTION INTRAVENOUS ONCE AS NEEDED
OUTPATIENT
Start: 2024-07-24

## 2024-05-29 ASSESSMENT — ENCOUNTER SYMPTOMS
ABDOMINAL PAIN: 0
VOMITING: 0
RECTAL PAIN: 0
NAUSEA: 0
DIARRHEA: 0
BLOOD IN STOOL: 1
ABDOMINAL DISTENTION: 0
CONSTIPATION: 0

## 2024-05-29 NOTE — PATIENT INSTRUCTIONS
"Today :Ethel SINGH Pancho \"Shaniqua\" had no medications administered during this visit.     For:   1. Crohn's disease of both small and large intestine without complication (Multi)         Your next appointment is due in:  56 days        Please read the  Medication Guide that was given to you and reviewed during todays visit.     (Tell all doctors including dentists that you are taking this medication)     Go to the emergency room or call 911 if:  -You have signs of allergic reaction:   -Rash, hives, itching.   -Swollen, blistered, peeling skin.   -Swelling of face, lips, mouth, tongue or throat.   -Tightness of chest, trouble breathing, swallowing or talking     Call your doctor:  - If IV / injection site gets red, warm, swollen, itchy or leaks fluid or pus.     (Leave dressing on your IV site for at least 2 hours and keep area clean and dry  - If you get sick or have symptoms of infection or are not feeling well for any reason.    (Wash your hands often, stay away from people who are sick)  - If you have side effects from your medication that do not go away or are bothersome.     (Refer to the teaching your nurse gave you for side effects to call your doctor about)    - Common side effects may include:  stuffy nose, headache, feeling tired, muscle aches, upset stomach  - Before receiving any vaccines     - Call the [facility] at   If:  - You get sick, are on antibiotics, have had a recent vaccine, have surgery or dental work and your doctor wants your visit rescheduled.  - You need to cancel and reschedule your visit for any reason. Call at least 2 days before your visit if you need to cancel.   - Your insurance changes before your next visit.    (We will need to get approval from your new insurance. This can take up to two weeks.)     [facility] is opened Monday thru Friday. We are closed on weekends and holidays.   Voice mail will take your call if the center is closed. If you " leave a message please allow 24 hours for a call back during weekdays. If you leave a message on a weekend/holiday, we will call you back the next business day.            Today :We administered inFLIXimab-dyyb (Inflectra) 600 mg in sodium chloride 0.9% 250 mL IV*.     For:   1. Crohn's disease of both small and large intestine without complication (Multi)         Your next appointment is due in:  56 days        Please read the  Medication Guide that was given to you and reviewed during todays visit.     (Tell all doctors including dentists that you are taking this medication)     Go to the emergency room or call 911 if:  -You have signs of allergic reaction:   -Rash, hives, itching.   -Swollen, blistered, peeling skin.   -Swelling of face, lips, mouth, tongue or throat.   -Tightness of chest, trouble breathing, swallowing or talking     Call your doctor:  - If IV / injection site gets red, warm, swollen, itchy or leaks fluid or pus.     (Leave dressing on your IV site for at least 2 hours and keep area clean and dry  - If you get sick or have symptoms of infection or are not feeling well for any reason.    (Wash your hands often, stay away from people who are sick)  - If you have side effects from your medication that do not go away or are bothersome.     (Refer to the teaching your nurse gave you for side effects to call your doctor about)    - Common side effects may include:  stuffy nose, headache, feeling tired, muscle aches, upset stomach  - Before receiving any vaccines     - Call the [facility] at   If:  - You get sick, are on antibiotics, have had a recent vaccine, have surgery or dental work and your doctor wants your visit rescheduled.  - You need to cancel and reschedule your visit for any reason. Call at least 2 days before your visit if you need to cancel.   - Your insurance changes before your next visit.    (We will need to get approval from your new insurance. This can take up  to two weeks.)     [facility] is opened Monday thru Friday. We are closed on weekends and holidays.   Voice mail will take your call if the center is closed. If you leave a message please allow 24 hours for a call back during weekdays. If you leave a message on a weekend/holiday, we will call you back the next business day.

## 2024-05-29 NOTE — PROGRESS NOTES
[facility]   Infusion Clinic Note   Date: May 29, 2024   Name: Ethel Deleon  : 1995   MRN: [id number]         Reason for Visit: OP Infusion (600 mg Inflectra)         We administered inFLIXimab-dyyb (Inflectra) 600 mg in sodium chloride 0.9% 250 mL IV*.       Visit Type: INFUSION       Ordered By: JEROME Cedeño      Accompanied by: mother      Diagnosis: Crohn's disease of both small and large intestine without complication (Multi)       Allergies:   Allergies as of 2024    (No Known Allergies)         Current Medications has a current medication list which includes the following prescription(s): ferrous sulfate (325 mg ferrous sulfate), fluoxetine, hydrocortisone, infliximab-dyyb, infliximab-dyyb, mesalamine, methylphenidate, methylphenidate cd, [START ON 2024] methylphenidate cd, [START ON 2024] methylphenidate cd, and nystatin, and the following Facility-Administered Medications: inFLIXimab-dyyb (Inflectra) 600 mg in sodium chloride 0.9% 250 mL IV*.       Vitals:   Vitals:    24 0853   BP: 115/69   Pulse: 58   Resp: 16   Temp: 36.6 °C (97.9 °F)   SpO2: 95%   Weight: 61.9 kg (136 lb 7.4 oz)             Infusion Pre-procedure Checklist:   - Allergies reviewed: yes   - Medications reviewed: yes       - Previous reaction to current treatment: no      Assess patient for the concerns below. Document provider notification as appropriate.  - Active or recent infection with/without current antibiotic use: no  - Recent or planned invasive dental work: no  - Recent or planned surgeries: no  - Recently received or plans to receive vaccinations: no  - Has treatment related toxicities: no  - Is pregnant:  no      Pain: 0   - Is the pain different from normal: n/a   - Is the pain tolerable: n/a   - Is your Doctor aware:  n/a      Labs: N/A         Fall Risk Screening:         Review Of Systems:  Review of Systems   Gastrointestinal:  Positive for blood in stool. Negative for  "abdominal distention, abdominal pain, constipation, diarrhea, nausea, rectal pain and vomiting.   All other systems reviewed and are negative.        Infusion Readiness:   - Assessment Concerns Related to Infusion: No  - Provider notified: no      Document Below Only If Indicated:   New Patient Education:    N/A (returning patient for continuation of therapy. Ongoing education provided as needed.)        Treatment Conditions & Drug Specific Questions:    InFLIXimab  (AVSOLA, INFLECTRA, REMICADE, RENFLEXIS)    (Unless otherwise specified on patient specific therapy plan):     TREATMENT CONDITIONS:  Unless otherwise specified on patient specific therapy plan HOLD and notify Provider prior to proceeding with today's infusion if patient with:  o Positive T-Spot  o Reactive Hep B sAg and/or Hep B Core Antibody    Lab Results   Component Value Date    TBSIN Negative 10/18/2023      Lab Results   Component Value Date    HEPBSAG NONREACTIVE 06/28/2023      No results found for: \"NONUHFIRE\", \"NONUHSWGH\", \"NONUHFISH\", \"EXTHEPBSAG\"  Lab Results   Component Value Date    HEPBCAB NONREACTIVE 10/30/2020     Lab Results   Component Value Date    HEPBCAB NONREACTIVE 10/30/2020        Labs reviewed and patient meets treatment conditions? Yes    DRUG SPECIFIC QUESTIONS:    Any new or worsening signs / symptoms of heart failure which may include things like worsening shortness of breath, swelling, fatigue? No   (If yes notify provider before proceeding with today's infusion. New onset or worsening HF have been reported with infliximab)    REMINDER:   WEIGHT BASED DRUG     Recommended Vitals/Observation:  Vitals:     Induction: Obtain vital signs every 30 minutes; at end of observation period and as needed.     Maintenance: Obtain vital signs at start and end of infusions  Observation:     Induction: Patient is monitored for 30 minutes post-infusion     Maintenance: No observation.        Weight Based Drug Calculations:    WEIGHT BASED " DRUGS: NOT APPLICABLE / FLAT DOSE  600 mg Inflectra        Initiated By: Mary Alice Ortega, RN

## 2024-07-24 ENCOUNTER — APPOINTMENT (OUTPATIENT)
Dept: INFUSION THERAPY | Facility: CLINIC | Age: 29
End: 2024-07-24
Payer: MEDICAID

## 2024-07-24 VITALS
RESPIRATION RATE: 16 BRPM | HEART RATE: 72 BPM | SYSTOLIC BLOOD PRESSURE: 108 MMHG | TEMPERATURE: 97.8 F | BODY MASS INDEX: 26.09 KG/M2 | OXYGEN SATURATION: 100 % | WEIGHT: 137.13 LBS | DIASTOLIC BLOOD PRESSURE: 76 MMHG

## 2024-07-24 DIAGNOSIS — K50.80 CROHN'S DISEASE OF BOTH SMALL AND LARGE INTESTINE WITHOUT COMPLICATION (MULTI): ICD-10-CM

## 2024-07-24 PROCEDURE — 96415 CHEMO IV INFUSION ADDL HR: CPT | Performed by: REGISTERED NURSE

## 2024-07-24 PROCEDURE — 96413 CHEMO IV INFUSION 1 HR: CPT | Performed by: REGISTERED NURSE

## 2024-07-24 RX ORDER — ALBUTEROL SULFATE 0.83 MG/ML
3 SOLUTION RESPIRATORY (INHALATION) AS NEEDED
OUTPATIENT
Start: 2024-09-18

## 2024-07-24 RX ORDER — FAMOTIDINE 10 MG/ML
20 INJECTION INTRAVENOUS ONCE AS NEEDED
OUTPATIENT
Start: 2024-09-18

## 2024-07-24 RX ORDER — DIPHENHYDRAMINE HYDROCHLORIDE 50 MG/ML
50 INJECTION INTRAMUSCULAR; INTRAVENOUS AS NEEDED
OUTPATIENT
Start: 2024-09-18

## 2024-07-24 RX ORDER — EPINEPHRINE 0.3 MG/.3ML
0.3 INJECTION SUBCUTANEOUS EVERY 5 MIN PRN
OUTPATIENT
Start: 2024-09-18

## 2024-07-24 NOTE — PATIENT INSTRUCTIONS
Today :We administered inFLIXimab-dyyb (Inflectra) 600 mg in sodium chloride 0.9% 250 mL IV.     For:   1. Crohn's disease of both small and large intestine without complication (Multi)         Your next appointment is due in:  56 DAYS        Please read the  Medication Guide that was given to you and reviewed during todays visit.     (Tell all doctors including dentists that you are taking this medication)     Go to the emergency room or call 911 if:  -You have signs of allergic reaction:   -Rash, hives, itching.   -Swollen, blistered, peeling skin.   -Swelling of face, lips, mouth, tongue or throat.   -Tightness of chest, trouble breathing, swallowing or talking     Call your doctor:  - If IV / injection site gets red, warm, swollen, itchy or leaks fluid or pus.     (Leave dressing on your IV site for at least 2 hours and keep area clean and dry  - If you get sick or have symptoms of infection or are not feeling well for any reason.    (Wash your hands often, stay away from people who are sick)  - If you have side effects from your medication that do not go away or are bothersome.     (Refer to the teaching your nurse gave you for side effects to call your doctor about)    - Common side effects may include:  stuffy nose, headache, feeling tired, muscle aches, upset stomach  - Before receiving any vaccines     - Call the [facility] at  [phone] If:  - You get sick, are on antibiotics, have had a recent vaccine, have surgery or dental work and your doctor wants your visit rescheduled.  - You need to cancel and reschedule your visit for any reason. Call at least 2 days before your visit if you need to cancel.   - Your insurance changes before your next visit.    (We will need to get approval from your new insurance. This can take up to two weeks.)     [facility] is opened Monday thru Friday, Saturdays We are closed on Sundays and holidays.   Voice mail will take your call if  the center is closed. If you leave a message please allow 24 hours for a call back during weekdays. If you leave a message on a weekend/holiday, we will call you back the next business day.

## 2024-07-24 NOTE — PROGRESS NOTES
[facility]   Infusion Clinic Note   Date: 2024   Name: Ethel Deleon  : 1995   MRN: [id number]         Reason for Visit: OP Infusion (PT HERE FOR Q 8 WEEK INFLECTRA INFUSION)      Accompanied by:MOM   Visit Type:: Infusion   Diagnosis: Crohn's disease of both small and large intestine without complication (Multi)    Allergies:   Allergies as of 2024    (No Known Allergies)      Current Meds has a current medication list which includes the following prescription(s): ferrous sulfate (325 mg ferrous sulfate), fluoxetine, hydrocortisone, infliximab-dyyb, infliximab-dyyb, mesalamine, methylphenidate, methylphenidate cd, methylphenidate cd, methylphenidate cd, and nystatin.        Vitals:  Vitals:    24 0806 24 1020   BP: 106/70 108/76   Pulse: 73 72   Resp: 16 16   Temp: 36.6 °C (97.8 °F) 36.6 °C (97.8 °F)   SpO2: 97% 100%   Weight: 62.2 kg (137 lb 2 oz)       Infusion Pre-procedure Checklist   Allergies reviewed: yes   Medications reviewed: yes   Contraindications to treatment:No   Previous reaction to current treatment:No   Current Health Issues: None   Pain: '    Is the pain different from normal: No   Is the pain tolerable: n/a   Is your Doctor aware: n/a   Contraindications based on patient history: No   Provider notified: Not applicable   Labs: Labs reviewed   Fall Risk Screening: Kyung Fall Risk  History of Falling, Immediate or Within 3 Months: No  Secondary Diagnosis: No  Ambulatory Aid: Walks without aid/bedrest/nurse assist  Intravenous Therapy/Heparin Lock: No  Gait/Transferring: Normal/bedrest/immobile  Mental Status: Oriented to own ability  Tracey Fall Risk Score: 0    Review of Systems - Oncology   Negative for complaint: [] all other systems have been reviewed and are negative for complaint   Infusion Readiness:   Assessment Concerns Related to Infusion: No  Provider notified: n/a  Assess patient for the concerns below. Document provider  "notification as appropriate:  - Does not meet criteria to treat No  - Has an active or recent infection with/without current antibiotic use No  - Has recent/planned dental work No  - Has recent/planned surgeries No  - Has recently received or plans to receive vaccinations No  - Has treatment related toxicities No  - Is pregnant (unless noted otherwise) No      (Unless otherwise specified on patient specific therapy plan):     TREATMENT CONDITIONS:  Unless otherwise specified on patient specific therapy plan HOLD and notify Provider prior to proceeding with today's infusion if patient with:  o Positive T-Spot  o Reactive Hep B sAg and/or Hep B Core Antibody    Lab Results   Component Value Date    TBSIN Negative 10/18/2023      Lab Results   Component Value Date    HEPBSAG NONREACTIVE 06/28/2023      No results found for: \"NONUHFIRE\", \"NONUHSWGH\", \"NONUHFISH\", \"EXTHEPBSAG\"  Lab Results   Component Value Date    HEPBCAB NONREACTIVE 10/30/2020     Lab Results   Component Value Date    HEPBCAB NONREACTIVE 10/30/2020        Labs reviewed and patient meets treatment conditions? Yes    DRUG SPECIFIC QUESTIONS:    Any new or worsening signs / symptoms of heart failure which may include things like worsening shortness of breath, swelling, fatigue? No   (If yes notify provider before proceeding with today's infusion. New onset or worsening HF have been reported with infliximab)      Patient's dosing weight (kg): 63.7     10% weight variance for prescribed treatment: 57.33 kg to 70.7 kg     Patient's weight today:   Vitals:    07/24/24 0806   Weight: 62.2 kg (137 lb 2 oz)         weight range for prescribed dose:     Patient weight today falls outside of 10% variance or  weight range: No     Home Care pharmacist informed of weight variance: Not applicable    Doses that are weight based have an acceptable variance rule within 10% of the prescribed   order and/or within  weight range. If " patient weight on day of infusion falls   outside of the 10% variance, or weight range, infusion is administered and   pharmacy contacted regarding future dosing adjustments, per policy.        Initiated By: Nelia Quijano RN   Time: 10:20 AM     We administered inFLIXimab-dyyb (Inflectra) 600 mg in sodium chloride 0.9% 250 mL IV.        All questions and concerns were addressed at time of visit. Patient was not independently evaluated by the Nurse Practitioner on site at [facility].    07/24/24 at 1:17 PM - TERESA Vaughan-CNP

## 2024-09-25 ENCOUNTER — APPOINTMENT (OUTPATIENT)
Dept: INFUSION THERAPY | Facility: CLINIC | Age: 29
End: 2024-09-25
Payer: MEDICAID

## 2024-09-25 VITALS
RESPIRATION RATE: 18 BRPM | TEMPERATURE: 98 F | SYSTOLIC BLOOD PRESSURE: 123 MMHG | HEART RATE: 90 BPM | OXYGEN SATURATION: 99 % | BODY MASS INDEX: 26.42 KG/M2 | WEIGHT: 138.89 LBS | DIASTOLIC BLOOD PRESSURE: 78 MMHG

## 2024-09-25 DIAGNOSIS — K50.80 CROHN'S DISEASE OF BOTH SMALL AND LARGE INTESTINE WITHOUT COMPLICATION (MULTI): ICD-10-CM

## 2024-09-25 PROCEDURE — 96413 CHEMO IV INFUSION 1 HR: CPT | Performed by: REGISTERED NURSE

## 2024-09-25 ASSESSMENT — ENCOUNTER SYMPTOMS
MUSCULOSKELETAL NEGATIVE: 1
CARDIOVASCULAR NEGATIVE: 1
CONSTITUTIONAL NEGATIVE: 1
RESPIRATORY NEGATIVE: 1
GASTROINTESTINAL NEGATIVE: 1
NEUROLOGICAL NEGATIVE: 1

## 2024-09-25 NOTE — PATIENT INSTRUCTIONS
Today :We administered inFLIXimab-dyyb (Inflectra) 600 mg in sodium chloride 0.9% 250 mL IV.     For:   1. Crohn's disease of both small and large intestine without complication (Multi)         Your next appointment is due in:  8 WEEKS        Please read the  Medication Guide that was given to you and reviewed during todays visit.     (Tell all doctors including dentists that you are taking this medication)     Go to the emergency room or call 911 if:  -You have signs of allergic reaction:   -Rash, hives, itching.   -Swollen, blistered, peeling skin.   -Swelling of face, lips, mouth, tongue or throat.   -Tightness of chest, trouble breathing, swallowing or talking     Call your doctor:  - If IV / injection site gets red, warm, swollen, itchy or leaks fluid or pus.     (Leave dressing on your IV site for at least 2 hours and keep area clean and dry  - If you get sick or have symptoms of infection or are not feeling well for any reason.    (Wash your hands often, stay away from people who are sick)  - If you have side effects from your medication that do not go away or are bothersome.     (Refer to the teaching your nurse gave you for side effects to call your doctor about)    - Common side effects may include:  stuffy nose, headache, feeling tired, muscle aches, upset stomach  - Before receiving any vaccines     - Call [facility] at   If:  - You get sick, are on antibiotics, have had a recent vaccine, have surgery or dental work and your doctor wants your visit rescheduled.  - You need to cancel and reschedule your visit for any reason. Call at least 2 days before your visit if you need to cancel.   - Your insurance changes before your next visit.    (We will need to get approval from your new insurance. This can take up to two weeks.)     [facility] is opened Monday thru Friday. We are closed on weekends and holidays.   Voice mail will take your call if the center is closed.  If you leave a message please allow 24 hours for a call back during weekdays. If you leave a message on a weekend/holiday, we will call you back the next business day.

## 2024-09-25 NOTE — PROGRESS NOTES
[facility]   Infusion Clinic Note   Date: 2024   Name: Ethel Deleon  : 1995   MRN: [id number]          Reason for Visit: OP Infusion (PATIENT HERE FOR Q 8 WEEK INFLECTRA 600 MG INFUSION)         Today: We administered inFLIXimab-dyyb (Inflectra) 600 mg in sodium chloride 0.9% 250 mL IV.       Visit Type: INFUSION       Ordered By: Dimple WEBSTER MD        Accompanied by:Parent       Diagnosis: Crohn's disease of both small and large intestine without complication (Multi)        Allergies:   Allergies as of 2024    (No Known Allergies)          Current Medications has a current medication list which includes the following prescription(s): ferrous sulfate (325 mg ferrous sulfate), fluoxetine, hydrocortisone, infliximab-dyyb, infliximab-dyyb, mesalamine, methylphenidate, methylphenidate cd, methylphenidate cd, methylphenidate cd, and nystatin.       Vitals:   Vitals:    24 0800   BP: 123/78   Pulse: 90   Resp: 18   Temp: 36.7 °C (98 °F)   SpO2: 99%   Weight: 63 kg (138 lb 14.2 oz)             Infusion Pre-procedure Checklist:   - Allergies reviewed: yes   - Medications reviewed: yes       - Previous reaction to current treatment: no      Assess patient for the concerns below. Document provider notification as appropriate.  - Active or recent infection with/without current antibiotic use: no  - Recent or planned invasive dental work: no  - Recent or planned surgeries: no  - Recently received or plans to receive vaccinations: no  - Has treatment related toxicities: no  - Is pregnant:  no      Pain: 0   - Is the pain different from normal: n/a   - Is your Doctor aware:  n/a       Labs:  REVIEWED          Fall Risk Screening: Kyung Fall Risk  History of Falling, Immediate or Within 3 Months: No  Secondary Diagnosis: No  Ambulatory Aid: Walks without aid/bedrest/nurse assist  Intravenous Therapy/Heparin Lock: No  Gait/Transferring:  "Normal/bedrest/immobile  Mental Status: Oriented to own ability  Tracey Fall Risk Score: 0       Review Of Systems:  Review of Systems   Constitutional: Negative.    Respiratory: Negative.     Cardiovascular: Negative.    Gastrointestinal: Negative.    Musculoskeletal: Negative.    Skin: Negative.    Neurological: Negative.          ROS completed? Yes      Infusion Readiness:  - Assessment Concerns Related to Infusion: No  - Provider notified: n/a      Document Below Only If Indicated:   New Patient Education:    N/A (returning patient for continuation of therapy. Ongoing education provided as needed.)        Treatment Conditions & Drug Specific Questions:    InFLIXimab  (AVSOLA, INFLECTRA, REMICADE, RENFLEXIS)    (Unless otherwise specified on patient specific therapy plan):     TREATMENT CONDITIONS:  Unless otherwise specified on patient specific therapy plan HOLD and notify Provider prior to proceeding with today's infusion if patient with:  o Positive T-Spot  o Reactive Hep B sAg and/or Hep B Core Antibody    Lab Results   Component Value Date    TBSIN Negative 10/18/2023      Lab Results   Component Value Date    HEPBSAG NONREACTIVE 06/28/2023      No results found for: \"NONUHFIRE\", \"NONUHSWGH\", \"NONUHFISH\", \"EXTHEPBSAG\"  Lab Results   Component Value Date    HEPBCAB NONREACTIVE 10/30/2020     Lab Results   Component Value Date    HEPBCAB NONREACTIVE 10/30/2020        Labs reviewed and patient meets treatment conditions? Yes    DRUG SPECIFIC QUESTIONS:    Any new or worsening signs / symptoms of heart failure which may include things like worsening shortness of breath, swelling, fatigue? No   (If yes notify provider before proceeding with today's infusion. New onset or worsening HF have been reported with infliximab)    REMINDER:   WEIGHT BASED DRUG     Recommended Vitals/Observation:  Vitals:     Induction: Obtain vital signs every 30 minutes; at end of observation period and as needed.     Maintenance: Obtain " vital signs at start and end of infusions  Observation:     Induction: Patient is monitored for 30 minutes post-infusion     Maintenance: No observation.        Weight Based Drug Calculations:    WEIGHT BASED DRUGS: Infliximab (REMICADE, INFLECTRA, RENFLEXIS)   Patient's dosing weight (kg): 63.7 KG     10% weight variance for prescribed treatment: 57.3 kg to 70 kg     Patient's weight today:   Vitals:    09/25/24 0800   Weight: 63 kg (138 lb 14.2 oz)         weight range for prescribed dose:     Patient weight today falls outside of 10% variance or  weight range: No     Home Care pharmacist informed of weight variance: Not applicable    Doses that are weight based have an acceptable variance rule within 10% of the prescribed   order and/or within  weight range. If patient weight on day of infusion falls   outside of the 10% variance, or weight range, infusion is administered and   pharmacy contacted regarding future dosing adjustments, per policy.         Initiated By: Dorothy Garcia RN      All questions and concerns were addressed at time of visit. Patient was not independently evaluated by the Nurse Practitioner on site at [facility].    09/25/24 at 10:54 AM - TERESA Vaughan-CNP

## 2024-10-04 DIAGNOSIS — K50.80 CROHN'S DISEASE OF BOTH SMALL AND LARGE INTESTINE WITHOUT COMPLICATION (MULTI): Primary | ICD-10-CM

## 2024-10-04 RX ORDER — FAMOTIDINE 10 MG/ML
20 INJECTION INTRAVENOUS ONCE AS NEEDED
OUTPATIENT
Start: 2024-11-13

## 2024-10-04 RX ORDER — DIPHENHYDRAMINE HYDROCHLORIDE 50 MG/ML
50 INJECTION INTRAMUSCULAR; INTRAVENOUS AS NEEDED
OUTPATIENT
Start: 2024-11-13

## 2024-10-04 RX ORDER — ALBUTEROL SULFATE 0.83 MG/ML
3 SOLUTION RESPIRATORY (INHALATION) AS NEEDED
OUTPATIENT
Start: 2024-11-13

## 2024-10-04 RX ORDER — EPINEPHRINE 0.3 MG/.3ML
0.3 INJECTION SUBCUTANEOUS EVERY 5 MIN PRN
OUTPATIENT
Start: 2024-11-13

## 2024-10-17 DIAGNOSIS — Z12.11 COLON CANCER SCREENING: ICD-10-CM

## 2024-10-17 DIAGNOSIS — K64.8 BLEEDING INTERNAL HEMORRHOIDS: ICD-10-CM

## 2024-10-18 ENCOUNTER — TELEPHONE (OUTPATIENT)
Dept: GASTROENTEROLOGY | Facility: CLINIC | Age: 29
End: 2024-10-18
Payer: MEDICAID

## 2024-10-18 DIAGNOSIS — K64.8 BLEEDING INTERNAL HEMORRHOIDS: ICD-10-CM

## 2024-10-18 RX ORDER — HYDROCORTISONE ACETATE 25 MG/1
25 SUPPOSITORY RECTAL 2 TIMES DAILY PRN
Qty: 24 SUPPOSITORY | Refills: 0 | Status: SHIPPED | OUTPATIENT
Start: 2024-10-18 | End: 2024-10-30

## 2024-10-18 RX ORDER — HYDROCORTISONE ACETATE 25 MG/1
25 SUPPOSITORY RECTAL 2 TIMES DAILY PRN
Qty: 12 SUPPOSITORY | Refills: 3 | Status: SHIPPED | OUTPATIENT
Start: 2024-10-18 | End: 2024-10-18 | Stop reason: DRUGHIGH

## 2024-10-18 RX ORDER — HYDROCORTISONE ACETATE 25 MG/1
25 SUPPOSITORY RECTAL 2 TIMES DAILY PRN
Qty: 24 SUPPOSITORY | Refills: 0 | Status: SHIPPED | OUTPATIENT
Start: 2024-10-18 | End: 2024-10-18 | Stop reason: SDUPTHER

## 2024-10-18 NOTE — TELEPHONE ENCOUNTER
----- Message from Bruno SOLORZANO sent at 10/18/2024 11:19 AM EDT -----  Bella Arturo called for clarification on suppositories. Is she to take once at bedtime or twice a day?

## 2024-11-07 NOTE — PROGRESS NOTES
REASON FOR VISIT:  Crohn's disease    HPI:  Ethel Deleon is a 29 y.o. female who presents for a follow up appointment     Patient with history of Crohn's disease   Currently on Inflectra 10mg/kg every 8 weeks . mesalamine suppositories prn - took once - 10/24   due  for infusion on 11/20/2024  Currently moving her bowels 1-2x per day - denies blood or melena . Occasional mucous . Stool appears dark. She is on iron supplement   the patient denies any skin rashes , joint pain or stiffness , mucous membrane ulcerations or eye pain / visual disturbances.  weight stable         Med list notable for Inflectra , mesalamine suppository     Labs notable for    No fhx of CRC.       Prev endoscopic eval:   >> Colonoscopy 06/2023  Impression:     - Simple Endoscopic Score for Crohn's Disease: 0,                          mucosal inflammatory changes secondary to quiescent                          Crohn's disease. Biopsied.                         - Pseudopolyps in the transverse colon and in the                          ascending colon.                         - External hemorrhoids.              - Recommended to repeat in 3 years     REVIEW OF SYSTEMS    Review of Systems   Constitutional: Negative for chills, decreased appetite, fever and weight loss.   Cardiovascular:  Negative for chest pain.   Respiratory:  Negative for cough and shortness of breath.    Gastrointestinal:  Negative for bloating, abdominal pain, constipation, diarrhea, dysphagia, heartburn, hematemesis, hematochezia and vomiting.          No Known Allergies    Past Medical History:   Diagnosis Date    Other conditions influencing health status 09/15/2020    No significant past medical history       Past Surgical History:   Procedure Laterality Date    OTHER SURGICAL HISTORY  08/23/2018    Complex / Multiple Fistula-in-ano Repair With Placement Of Seton       Family History   Problem Relation Name Age of Onset    No Known Problems Mother      No Known  Problems Father         Social History     Tobacco Use    Smoking status: Never    Smokeless tobacco: Never   Substance Use Topics    Alcohol use: Not on file       Current Outpatient Medications   Medication Sig Dispense Refill    ferrous sulfate, 325 mg ferrous sulfate, (FeroSuL) tablet Take 1 tablet by mouth once daily. 90 tablet 3    FLUoxetine (PROzac) 10 mg tablet Take 2 tablets (20 mg) by mouth once daily. 60 tablet 11    inFLIXimab-dyyb (Inflectra) 100 mg injection Inflectra 100 MG Intravenous Solution Reconstituted   Refills: 0        Start : 13-Apr-2021   Active      inFLIXimab-dyyb (Inflectra) 100 mg injection Infuse 600 mg into a venous catheter every 8 (eight) weeks.  FOR AIC NR 6 each 3    mesalamine (Canasa) 1,000 mg suppository Insert into the rectum. UNWRAP AND INSERT ONE SUPPOSITORY RECTALLY EVERY NIGHT AT BEDTIME FOR3 WEEKS THEN EVERY OTHER BEDTIME AS NEEDED      methylphenidate (Ritalin) 10 mg tablet Take 1 tablet (10 mg) by mouth once daily. 30 tablet 0    methylphenidate CD (Metadate CD) 10 mg daily capsule Take 1 capsule (10 mg) by mouth once daily in the morning. 30 capsule 0    methylphenidate CD (Metadate CD) 10 mg daily capsule Take 1 capsule (10 mg) by mouth once daily in the morning. Do not fill before June 7, 2024. 30 capsule 0    methylphenidate CD (Metadate CD) 10 mg daily capsule Take 1 capsule (10 mg) by mouth once daily in the morning. Do not fill before July 7, 2024. 30 capsule 0    nystatin (Mycostatin) cream Nystatin [id number] UNIT/GM External Cream   Quantity: 30  Refills: 0        Start : 10-Aug-2021   Active       No current facility-administered medications for this visit.       PHYSICAL EXAM:  There were no vitals taken for this visit.     Physical Exam  Constitutional:       Comments: Awake   HENT:      Head: Normocephalic.   Cardiovascular:      Rate and Rhythm: Normal rate and regular rhythm.   Pulmonary:      Effort: Pulmonary effort is normal.      Breath sounds: Normal  breath sounds.   Abdominal:      General: Bowel sounds are normal.      Palpations: Abdomen is soft.   Genitourinary:     Rectum: Normal.      Comments: Skin tags, external hemorrhoids -  Neurological:      Mental Status: She is alert.   Psychiatric:         Mood and Affect: Mood normal.          ASSESSMENT    29-year-old female diagnosed with Crohn's disease diagnosed in 2020.  She was initially diagnosed with perianal disease small bowel and large bowel disease.  She was started on Fleximate infusions every 8 weeks.  Her previous drug level was 7.8 with no evidence of antidrug antibody.  She has been doing well overall.  She noticed intermittent joint pain and a skin rash that have resolved.  She denies any other extraintestinal manifestations.  We will check labs prior to her next infusion.  Her most recent colonoscopy in 2023 was consistent with an SES Crohn score of 0.   I will see her in follow-up in 6 months    Evaluation requested by Dr. Sharona Gaona DO.   My final recommendations will be communicated back to the requesting physician by way of shared Medical record or letter to requesting physician via fax.      Attending Note:   I have personally performed a face to face assessment of this Patient, which included an interview, physical exam and Assessment and Plan.  I have reviewed and confirmed the history and key findings as documented by the Medical Assistant and edited as appropriate.     Signature: Dimple Cedeño MD    Date: 11/7/2024  Time: 9:59 AM

## 2024-11-12 ENCOUNTER — APPOINTMENT (OUTPATIENT)
Dept: GASTROENTEROLOGY | Facility: CLINIC | Age: 29
End: 2024-11-12
Payer: MEDICAID

## 2024-11-12 VITALS
HEIGHT: 60 IN | SYSTOLIC BLOOD PRESSURE: 106 MMHG | TEMPERATURE: 97.3 F | DIASTOLIC BLOOD PRESSURE: 63 MMHG | BODY MASS INDEX: 27.48 KG/M2 | WEIGHT: 140 LBS | HEART RATE: 68 BPM

## 2024-11-12 DIAGNOSIS — K50.80 CROHN'S DISEASE OF BOTH SMALL AND LARGE INTESTINE WITHOUT COMPLICATION (MULTI): Primary | ICD-10-CM

## 2024-11-12 PROCEDURE — 1036F TOBACCO NON-USER: CPT | Performed by: INTERNAL MEDICINE

## 2024-11-12 PROCEDURE — 99214 OFFICE O/P EST MOD 30 MIN: CPT | Performed by: INTERNAL MEDICINE

## 2024-11-12 PROCEDURE — 3008F BODY MASS INDEX DOCD: CPT | Performed by: INTERNAL MEDICINE

## 2024-11-12 ASSESSMENT — ENCOUNTER SYMPTOMS
DECREASED APPETITE: 0
COUGH: 0
WEIGHT LOSS: 0
BLOATING: 0
VOMITING: 0
HEMATOCHEZIA: 0
CONSTIPATION: 0
FEVER: 0
SHORTNESS OF BREATH: 0
ABDOMINAL PAIN: 0
CHILLS: 0
DIARRHEA: 0
HEMATEMESIS: 0
HEARTBURN: 0

## 2024-11-20 ENCOUNTER — APPOINTMENT (OUTPATIENT)
Dept: INFUSION THERAPY | Facility: CLINIC | Age: 29
End: 2024-11-20
Payer: MEDICAID

## 2024-11-20 VITALS
BODY MASS INDEX: 27.58 KG/M2 | HEART RATE: 69 BPM | SYSTOLIC BLOOD PRESSURE: 122 MMHG | WEIGHT: 141.2 LBS | OXYGEN SATURATION: 98 % | DIASTOLIC BLOOD PRESSURE: 73 MMHG | TEMPERATURE: 97.5 F | RESPIRATION RATE: 16 BRPM

## 2024-11-20 DIAGNOSIS — K50.80 CROHN'S DISEASE OF BOTH SMALL AND LARGE INTESTINE WITHOUT COMPLICATION (MULTI): ICD-10-CM

## 2024-11-20 LAB
ALBUMIN SERPL BCP-MCNC: 4.6 G/DL (ref 3.4–5)
ALP SERPL-CCNC: 69 U/L (ref 33–110)
ALT SERPL W P-5'-P-CCNC: 15 U/L (ref 7–45)
ANION GAP SERPL CALC-SCNC: 9 MMOL/L (ref 10–20)
AST SERPL W P-5'-P-CCNC: 17 U/L (ref 9–39)
BASOPHILS # BLD AUTO: 0.04 X10*3/UL (ref 0–0.1)
BASOPHILS NFR BLD AUTO: 0.9 %
BILIRUB SERPL-MCNC: 0.4 MG/DL (ref 0–1.2)
BUN SERPL-MCNC: 12 MG/DL (ref 6–23)
CALCIUM SERPL-MCNC: 9.7 MG/DL (ref 8.6–10.3)
CHLORIDE SERPL-SCNC: 107 MMOL/L (ref 98–107)
CO2 SERPL-SCNC: 28 MMOL/L (ref 21–32)
CREAT SERPL-MCNC: 0.65 MG/DL (ref 0.5–1.05)
CRP SERPL-MCNC: 0.11 MG/DL
EGFRCR SERPLBLD CKD-EPI 2021: >90 ML/MIN/1.73M*2
EOSINOPHIL # BLD AUTO: 0.14 X10*3/UL (ref 0–0.7)
EOSINOPHIL NFR BLD AUTO: 3 %
ERYTHROCYTE [DISTWIDTH] IN BLOOD BY AUTOMATED COUNT: 12.5 % (ref 11.5–14.5)
ERYTHROCYTE [SEDIMENTATION RATE] IN BLOOD BY WESTERGREN METHOD: 12 MM/H (ref 0–20)
GLUCOSE SERPL-MCNC: 120 MG/DL (ref 74–99)
HCT VFR BLD AUTO: 41.8 % (ref 36–46)
HGB BLD-MCNC: 14.9 G/DL (ref 12–16)
IMM GRANULOCYTES # BLD AUTO: 0.01 X10*3/UL (ref 0–0.7)
IMM GRANULOCYTES NFR BLD AUTO: 0.2 % (ref 0–0.9)
LYMPHOCYTES # BLD AUTO: 1.43 X10*3/UL (ref 1.2–4.8)
LYMPHOCYTES NFR BLD AUTO: 30.6 %
MCH RBC QN AUTO: 32.9 PG (ref 26–34)
MCHC RBC AUTO-ENTMCNC: 35.6 G/DL (ref 32–36)
MCV RBC AUTO: 92 FL (ref 80–100)
MONOCYTES # BLD AUTO: 0.55 X10*3/UL (ref 0.1–1)
MONOCYTES NFR BLD AUTO: 11.8 %
NEUTROPHILS # BLD AUTO: 2.5 X10*3/UL (ref 1.2–7.7)
NEUTROPHILS NFR BLD AUTO: 53.5 %
NRBC BLD-RTO: 0 /100 WBCS (ref 0–0)
PLATELET # BLD AUTO: 252 X10*3/UL (ref 150–450)
POTASSIUM SERPL-SCNC: 4.1 MMOL/L (ref 3.5–5.3)
PROT SERPL-MCNC: 7.2 G/DL (ref 6.4–8.2)
RBC # BLD AUTO: 4.53 X10*6/UL (ref 4–5.2)
SODIUM SERPL-SCNC: 140 MMOL/L (ref 136–145)
WBC # BLD AUTO: 4.7 X10*3/UL (ref 4.4–11.3)

## 2024-11-20 PROCEDURE — 86140 C-REACTIVE PROTEIN: CPT

## 2024-11-20 PROCEDURE — 96413 CHEMO IV INFUSION 1 HR: CPT | Performed by: REGISTERED NURSE

## 2024-11-20 PROCEDURE — 80230 DRUG ASSAY INFLIXIMAB: CPT

## 2024-11-20 PROCEDURE — 96415 CHEMO IV INFUSION ADDL HR: CPT | Performed by: REGISTERED NURSE

## 2024-11-20 PROCEDURE — 85652 RBC SED RATE AUTOMATED: CPT

## 2024-11-20 PROCEDURE — 80053 COMPREHEN METABOLIC PANEL: CPT

## 2024-11-20 PROCEDURE — 85025 COMPLETE CBC W/AUTO DIFF WBC: CPT

## 2024-11-20 PROCEDURE — 82542 COL CHROMOTOGRAPHY QUAL/QUAN: CPT

## 2024-11-20 RX ORDER — FAMOTIDINE 10 MG/ML
20 INJECTION INTRAVENOUS ONCE AS NEEDED
OUTPATIENT
Start: 2025-01-08

## 2024-11-20 RX ORDER — ALBUTEROL SULFATE 0.83 MG/ML
3 SOLUTION RESPIRATORY (INHALATION) AS NEEDED
OUTPATIENT
Start: 2025-01-08

## 2024-11-20 RX ORDER — DIPHENHYDRAMINE HYDROCHLORIDE 50 MG/ML
50 INJECTION INTRAMUSCULAR; INTRAVENOUS AS NEEDED
OUTPATIENT
Start: 2025-01-08

## 2024-11-20 RX ORDER — EPINEPHRINE 0.3 MG/.3ML
0.3 INJECTION SUBCUTANEOUS EVERY 5 MIN PRN
OUTPATIENT
Start: 2025-01-08

## 2024-11-20 ASSESSMENT — ENCOUNTER SYMPTOMS
NEUROLOGICAL NEGATIVE: 1
MUSCULOSKELETAL NEGATIVE: 1
CONSTITUTIONAL NEGATIVE: 1
GASTROINTESTINAL NEGATIVE: 1
CARDIOVASCULAR NEGATIVE: 1
RESPIRATORY NEGATIVE: 1

## 2024-11-20 NOTE — PATIENT INSTRUCTIONS
Today :We administered inFLIXimab-dyyb (Inflectra) 600 mg in sodium chloride 0.9% 250 mL IV.     For:   1. Crohn's disease of both small and large intestine without complication (Multi)         Your next appointment is due in:  8 WEEKS        Please read the  Medication Guide that was given to you and reviewed during todays visit.     (Tell all doctors including dentists that you are taking this medication)     Go to the emergency room or call 911 if:  -You have signs of allergic reaction:   -Rash, hives, itching.   -Swollen, blistered, peeling skin.   -Swelling of face, lips, mouth, tongue or throat.   -Tightness of chest, trouble breathing, swallowing or talking     Call your doctor:  - If IV / injection site gets red, warm, swollen, itchy or leaks fluid or pus.     (Leave dressing on your IV site for at least 2 hours and keep area clean and dry  - If you get sick or have symptoms of infection or are not feeling well for any reason.    (Wash your hands often, stay away from people who are sick)  - If you have side effects from your medication that do not go away or are bothersome.     (Refer to the teaching your nurse gave you for side effects to call your doctor about)    - Common side effects may include:  stuffy nose, headache, feeling tired, muscle aches, upset stomach  - Before receiving any vaccines     - Call [facility] at   If:  - You get sick, are on antibiotics, have had a recent vaccine, have surgery or dental work and your doctor wants your visit rescheduled.  - You need to cancel and reschedule your visit for any reason. Call at least 2 days before your visit if you need to cancel.   - Your insurance changes before your next visit.    (We will need to get approval from your new insurance. This can take up to two weeks.)     [facility] is opened Monday thru Friday. We are closed on weekends and holidays.   Voice mail will take your call if the center is closed.  If you leave a message please allow 24 hours for a call back during weekdays. If you leave a message on a weekend/holiday, we will call you back the next business day.    A pharmacist is available Monday - Friday from 8:30AM to 3:30PM to help answer any questions you may have about your prescriptions(s). Please call pharmacy at:    [facility]: [phone]  [facility]: [phone]  [facility]: [phone]

## 2024-11-20 NOTE — PROGRESS NOTES
[facility]   Infusion Clinic Note   Date: 2024   Name: Ethel Deleon  : 1995   MRN: [id number]          Reason for Visit: OP Infusion (PATIENT HERE FOR Q 8 WEEK INFLECTRA 600 MG INFUSION)         Today: We administered inFLIXimab-dyyb (Inflectra) 600 mg in sodium chloride 0.9% 250 mL IV.       Visit Type: INFUSION       Ordered By: Dimple WEBSTER MD        Accompanied by:Parent       Diagnosis: Crohn's disease of both small and large intestine without complication (Multi)        Allergies:   Allergies as of 2024    (No Known Allergies)          Current Medications has a current medication list which includes the following prescription(s): ferrous sulfate (325 mg ferrous sulfate), fluoxetine, infliximab-dyyb, infliximab-dyyb, mesalamine, methylphenidate, methylphenidate cd, methylphenidate cd, methylphenidate cd, and nystatin.       Vitals:   Vitals:    24 0754   BP: 122/73   Pulse: 69   Resp: 16   Temp: 36.4 °C (97.5 °F)   SpO2: 98%   Weight: 64 kg (141 lb 3.3 oz)             Infusion Pre-procedure Checklist:   - Allergies reviewed: yes   - Medications reviewed: yes       - Previous reaction to current treatment: no      Assess patient for the concerns below. Document provider notification as appropriate.  - Active or recent infection with/without current antibiotic use: no  - Recent or planned invasive dental work: no  - Recent or planned surgeries: no  - Recently received or plans to receive vaccinations: no  - Has treatment related toxicities: no  - Is pregnant:  no      Pain: 0   - Is the pain different from normal: n/a   - Is your Doctor aware:  n/a       Labs:  REVIEWED           Fall Risk Screening: Kyung Fall Risk  History of Falling, Immediate or Within 3 Months: No  Secondary Diagnosis: No  Ambulatory Aid: Walks without aid/bedrest/nurse assist  Intravenous Therapy/Heparin Lock: No  Gait/Transferring: Normal/bedrest/immobile  Mental Status:  "Oriented to own ability  Tracey Fall Risk Score: 0       Review Of Systems:  Review of Systems   Constitutional: Negative.    Respiratory: Negative.     Cardiovascular: Negative.    Gastrointestinal: Negative.    Musculoskeletal: Negative.    Skin: Negative.    Neurological: Negative.          ROS completed? Yes      Infusion Readiness:  - Assessment Concerns Related to Infusion: No  - Provider notified: n/a      Document Below Only If Indicated:   New Patient Education:    N/A (returning patient for continuation of therapy. Ongoing education provided as needed.)        Treatment Conditions & Drug Specific Questions:    InFLIXimab  (AVSOLA, INFLECTRA, REMICADE, RENFLEXIS)    (Unless otherwise specified on patient specific therapy plan):     TREATMENT CONDITIONS:  Unless otherwise specified on patient specific therapy plan HOLD and notify Provider prior to proceeding with today's infusion if patient with:  o Positive T-Spot  o Reactive Hep B sAg and/or Hep B Core Antibody    Lab Results   Component Value Date    TBSIN Negative 10/18/2023      Lab Results   Component Value Date    HEPBSAG NONREACTIVE 06/28/2023      No results found for: \"NONUHFIRE\", \"NONUHSWGH\", \"NONUHFISH\", \"EXTHEPBSAG\"  Lab Results   Component Value Date    HEPBCAB NONREACTIVE 10/30/2020     Lab Results   Component Value Date    HEPBCAB NONREACTIVE 10/30/2020        Labs reviewed and patient meets treatment conditions? Yes    DRUG SPECIFIC QUESTIONS:    Any new or worsening signs / symptoms of heart failure which may include things like worsening shortness of breath, swelling, fatigue? No   (If yes notify provider before proceeding with today's infusion. New onset or worsening HF have been reported with infliximab)    REMINDER:   WEIGHT BASED DRUG     Recommended Vitals/Observation:  Vitals:     Induction: Obtain vital signs every 30 minutes; at end of observation period and as needed.     Maintenance: Obtain vital signs at start and end of " infusions  Observation:     Induction: Patient is monitored for 30 minutes post-infusion     Maintenance: No observation.        Weight Based Drug Calculations:    WEIGHT BASED DRUGS: NOT APPLICABLE / FLAT DOSE          Initiated By: Dorothy Garcia RN     All questions and concerns were addressed at time of visit by nursing staff. Patient was not independently evaluated by the Nurse Practitioner on site at [facility].    11/20/24 at 10:52 AM - TERESA Vaughan-CNP

## 2024-11-25 LAB — SCAN RESULT: NORMAL

## 2024-12-05 ENCOUNTER — DOCUMENTATION (OUTPATIENT)
Dept: PEDIATRIC NEUROLOGY | Facility: CLINIC | Age: 29
End: 2024-12-05
Payer: MEDICAID

## 2024-12-05 DIAGNOSIS — F41.1 GENERALIZED ANXIETY DISORDER: ICD-10-CM

## 2024-12-05 RX ORDER — FLUOXETINE 10 MG/1
30 TABLET ORAL DAILY
Qty: 90 TABLET | Refills: 11 | Status: SHIPPED | OUTPATIENT
Start: 2024-12-05 | End: 2025-12-05

## 2024-12-05 NOTE — PROGRESS NOTES
Anxiety has been elevated. She has been on Prozac 20 mg daily for quite some time. OK to increase dose to 30 mg and note effect. Mom or Shaniqua to call with updates.

## 2024-12-09 DIAGNOSIS — F90.0 ATTENTION DEFICIT HYPERACTIVITY DISORDER (ADHD), PREDOMINANTLY INATTENTIVE TYPE: ICD-10-CM

## 2024-12-09 RX ORDER — METHYLPHENIDATE HYDROCHLORIDE 10 MG/1
10 CAPSULE, EXTENDED RELEASE ORAL EVERY MORNING
Qty: 30 CAPSULE | Refills: 0 | Status: SHIPPED | OUTPATIENT
Start: 2025-01-09

## 2024-12-09 RX ORDER — METHYLPHENIDATE HYDROCHLORIDE 10 MG/1
10 CAPSULE, EXTENDED RELEASE ORAL EVERY MORNING
Qty: 30 CAPSULE | Refills: 0 | Status: SHIPPED | OUTPATIENT
Start: 2025-02-09

## 2024-12-09 RX ORDER — METHYLPHENIDATE HYDROCHLORIDE 10 MG/1
10 CAPSULE, EXTENDED RELEASE ORAL EVERY MORNING
Qty: 30 CAPSULE | Refills: 0 | Status: SHIPPED | OUTPATIENT
Start: 2024-12-09 | End: 2024-12-11 | Stop reason: SDUPTHER

## 2024-12-11 DIAGNOSIS — F90.0 ATTENTION DEFICIT HYPERACTIVITY DISORDER (ADHD), PREDOMINANTLY INATTENTIVE TYPE: ICD-10-CM

## 2024-12-11 RX ORDER — METHYLPHENIDATE HYDROCHLORIDE 10 MG/1
10 CAPSULE, EXTENDED RELEASE ORAL EVERY MORNING
Qty: 30 CAPSULE | Refills: 0 | Status: SHIPPED | OUTPATIENT
Start: 2024-12-11

## 2024-12-12 DIAGNOSIS — F90.0 ATTENTION DEFICIT HYPERACTIVITY DISORDER (ADHD), PREDOMINANTLY INATTENTIVE TYPE: ICD-10-CM

## 2024-12-12 RX ORDER — METHYLPHENIDATE HYDROCHLORIDE 10 MG/1
10 TABLET ORAL DAILY
Qty: 30 TABLET | Refills: 0 | Status: SHIPPED | OUTPATIENT
Start: 2024-12-12 | End: 2025-01-11

## 2025-01-08 ENCOUNTER — APPOINTMENT (OUTPATIENT)
Dept: INFUSION THERAPY | Facility: CLINIC | Age: 30
End: 2025-01-08
Payer: MEDICAID

## 2025-01-08 ENCOUNTER — INFUSION (OUTPATIENT)
Dept: INFUSION THERAPY | Facility: CLINIC | Age: 30
End: 2025-01-08
Payer: MEDICAID

## 2025-01-08 VITALS
SYSTOLIC BLOOD PRESSURE: 118 MMHG | OXYGEN SATURATION: 100 % | WEIGHT: 141.76 LBS | DIASTOLIC BLOOD PRESSURE: 72 MMHG | TEMPERATURE: 97 F | HEART RATE: 61 BPM | BODY MASS INDEX: 27.68 KG/M2 | RESPIRATION RATE: 16 BRPM

## 2025-01-08 DIAGNOSIS — K50.80 CROHN'S DISEASE OF BOTH SMALL AND LARGE INTESTINE WITHOUT COMPLICATION (MULTI): ICD-10-CM

## 2025-01-08 PROCEDURE — 96413 CHEMO IV INFUSION 1 HR: CPT | Performed by: REGISTERED NURSE

## 2025-01-08 RX ORDER — FAMOTIDINE 10 MG/ML
20 INJECTION INTRAVENOUS ONCE AS NEEDED
OUTPATIENT
Start: 2025-01-15

## 2025-01-08 RX ORDER — ALBUTEROL SULFATE 0.83 MG/ML
3 SOLUTION RESPIRATORY (INHALATION) AS NEEDED
OUTPATIENT
Start: 2025-01-15

## 2025-01-08 RX ORDER — DIPHENHYDRAMINE HYDROCHLORIDE 50 MG/ML
50 INJECTION INTRAMUSCULAR; INTRAVENOUS AS NEEDED
OUTPATIENT
Start: 2025-01-15

## 2025-01-08 RX ORDER — EPINEPHRINE 0.3 MG/.3ML
0.3 INJECTION SUBCUTANEOUS EVERY 5 MIN PRN
OUTPATIENT
Start: 2025-01-15

## 2025-01-08 ASSESSMENT — ENCOUNTER SYMPTOMS
APPETITE CHANGE: 0
CONSTIPATION: 0
UNEXPECTED WEIGHT CHANGE: 0
CHILLS: 0
HEADACHES: 0
ARTHRALGIAS: 0
BLOOD IN STOOL: 0
NAUSEA: 0
LIGHT-HEADEDNESS: 0
EXTREMITY WEAKNESS: 0
DYSURIA: 0
HEMATURIA: 0
EYE PROBLEMS: 0
VOICE CHANGE: 0
SORE THROAT: 0
VOMITING: 0
FEVER: 0
NUMBNESS: 0
FATIGUE: 0
FREQUENCY: 0
DIZZINESS: 0
LEG SWELLING: 0
WHEEZING: 0
DIARRHEA: 0
ABDOMINAL PAIN: 0
TROUBLE SWALLOWING: 0
MYALGIAS: 0
COUGH: 0
WOUND: 0
SHORTNESS OF BREATH: 0
PALPITATIONS: 0

## 2025-01-08 NOTE — PATIENT INSTRUCTIONS
Today :We administered inFLIXimab-dyyb (Inflectra) 600 mg in sodium chloride 0.9% 250 mL IV.     For:   1. Crohn's disease of both small and large intestine without complication (Multi)         Your next appointment is due in:  56 days        Please read the  Medication Guide that was given to you and reviewed during todays visit.     (Tell all doctors including dentists that you are taking this medication)     Go to the emergency room or call 911 if:  -You have signs of allergic reaction:   -Rash, hives, itching.   -Swollen, blistered, peeling skin.   -Swelling of face, lips, mouth, tongue or throat.   -Tightness of chest, trouble breathing, swallowing or talking     Call your doctor:  - If IV / injection site gets red, warm, swollen, itchy or leaks fluid or pus.     (Leave dressing on your IV site for at least 2 hours and keep area clean and dry  - If you get sick or have symptoms of infection or are not feeling well for any reason.    (Wash your hands often, stay away from people who are sick)  - If you have side effects from your medication that do not go away or are bothersome.     (Refer to the teaching your nurse gave you for side effects to call your doctor about)    - Common side effects may include:  stuffy nose, headache, feeling tired, muscle aches, upset stomach  - Before receiving any vaccines     - Call [facility] at   If:  - You get sick, are on antibiotics, have had a recent vaccine, have surgery or dental work and your doctor wants your visit rescheduled.  - You need to cancel and reschedule your visit for any reason. Call at least 2 days before your visit if you need to cancel.   - Your insurance changes before your next visit.    (We will need to get approval from your new insurance. This can take up to two weeks.)     [facility] is opened Monday thru Friday. We are closed on weekends and holidays.   Voice mail will take your call if the center is closed.  If you leave a message please allow 24 hours for a call back during weekdays. If you leave a message on a weekend/holiday, we will call you back the next business day.    A pharmacist is available Monday - Friday from 8:30AM to 3:30PM to help answer any questions you may have about your prescriptions(s). Please call pharmacy at:    [facility]: [phone]  [facility]: [phone]  [facility]: [phone]

## 2025-01-08 NOTE — PROGRESS NOTES
[facility]   Infusion Clinic Note   Date: 2025   Name: Ethel Deleon  : 1995   MRN: [id number]          Reason for Visit: OP Infusion (Infleximab 600mg every 56 days)         Today: We administered inFLIXimab-dyyb (Inflectra) 600 mg in sodium chloride 0.9% 250 mL IV.       Visit Type: INFUSION       Ordered By: Dr JEROME Cedeño       Accompanied by:Parent       Diagnosis: Crohn's disease of both small and large intestine without complication (Multi)        Allergies:   Allergies as of 2025    (No Known Allergies)          Current Medications has a current medication list which includes the following prescription(s): ferrous sulfate (325 mg ferrous sulfate), fluoxetine, infliximab-dyyb, infliximab-dyyb, mesalamine, methylphenidate, [START ON 2025] methylphenidate cd, [START ON 2025] methylphenidate cd, methylphenidate cd, and nystatin.       Vitals:   Vitals:    25 1305 25 1352 25 1454   BP: 118/80 122/71 118/72   Pulse: 81 55 61   Resp: 16 16 16   Temp: 36.1 °C (97 °F) 36.1 °C (97 °F)    SpO2: 100% 99% 100%   Weight: 64.3 kg (141 lb 12.1 oz)               Infusion Pre-procedure Checklist:   - Allergies reviewed: yes   - Medications reviewed: yes       - Previous reaction to current treatment: no      Assess patient for the concerns below. Document provider notification as appropriate.  - Active or recent infection with/without current antibiotic use: no  - Recent or planned invasive dental work: no  - Recent or planned surgeries: no  - Recently received or plans to receive vaccinations: no  - Has treatment related toxicities: no  - Is pregnant:  no      Pain: 0   - Is the pain different from normal: no   - Is your Doctor aware:  n/a       Labs: N/A          Fall Risk Screening: Kyung Fall Risk  History of Falling, Immediate or Within 3 Months: No  Secondary Diagnosis: No  Ambulatory Aid: Walks without aid/bedrest/nurse assist  Intravenous  "Therapy/Heparin Lock: Yes  Gait/Transferring: Normal/bedrest/immobile  Mental Status: Oriented to own ability  Tracey Fall Risk Score: 20       Review Of Systems:  Review of Systems   Constitutional:  Negative for appetite change, chills, fatigue, fever and unexpected weight change.   HENT:   Negative for hearing loss, mouth sores, sore throat, tinnitus, trouble swallowing and voice change.    Eyes:  Negative for eye problems.   Respiratory:  Negative for cough, shortness of breath and wheezing.    Cardiovascular:  Negative for chest pain, leg swelling and palpitations.   Gastrointestinal:  Negative for abdominal pain, blood in stool, constipation, diarrhea, nausea and vomiting.   Genitourinary:  Negative for dysuria, frequency and hematuria.    Musculoskeletal:  Negative for arthralgias and myalgias.   Skin:  Negative for itching, rash and wound.   Neurological:  Negative for dizziness, extremity weakness, headaches, light-headedness and numbness.         ROS completed? Yes      Infusion Readiness:  - Assessment Concerns Related to Infusion: No  - Provider notified: n/a      Document Below Only If Indicated:   New Patient Education:    N/A (returning patient for continuation of therapy. Ongoing education provided as needed.)        Treatment Conditions & Drug Specific Questions:    InFLIXimab  (AVSOLA, INFLECTRA, REMICADE, RENFLEXIS)    (Unless otherwise specified on patient specific therapy plan):     TREATMENT CONDITIONS:  Unless otherwise specified on patient specific therapy plan HOLD and notify Provider prior to proceeding with today's infusion if patient with:  o Positive T-Spot  o Reactive Hep B sAg and/or Hep B Core Antibody    Lab Results   Component Value Date    TBSIN Negative 10/18/2023      Lab Results   Component Value Date    HEPBSAG NONREACTIVE 06/28/2023      No results found for: \"NONUHFIRE\", \"NONUHSWGH\", \"NONUHFISH\", \"EXTHEPBSAG\"  Lab Results   Component Value Date    HEPBCAB NONREACTIVE " 10/30/2020     Lab Results   Component Value Date    HEPBCAB NONREACTIVE 10/30/2020        Labs reviewed and patient meets treatment conditions? Yes    DRUG SPECIFIC QUESTIONS:    Any new or worsening signs / symptoms of heart failure which may include things like worsening shortness of breath, swelling, fatigue? No   (If yes notify provider before proceeding with today's infusion. New onset or worsening HF have been reported with infliximab)    REMINDER:   WEIGHT BASED DRUG     Recommended Vitals/Observation:  Vitals:     Induction: Obtain vital signs every 30 minutes; at end of observation period and as needed.     Maintenance: Obtain vital signs at start and end of infusions  Observation:     Induction: Patient is monitored for 30 minutes post-infusion      Maintenance: No observation.        Weight Based Drug Calculations:    WEIGHT BASED DRUGS: Infliximab (REMICADE, INFLECTRA, RENFLEXIS)   Patient's dosing weight (kg): 63.7     10% weight variance for prescribed treatment: 57.3 kg to 70.1 kg     Patient's weight today:   Vitals:    01/08/25 1305   Weight: 64.3 kg (141 lb 12.1 oz)         weight range for prescribed dose:     Patient weight today falls outside of 10% variance or  weight range: No     Home Care pharmacist informed of weight variance: Not applicable    Doses that are weight based have an acceptable variance rule within 10% of the prescribed   order and/or within  weight range. If patient weight on day of infusion falls   outside of the 10% variance, or weight range, infusion is administered and   pharmacy contacted regarding future dosing adjustments, per policy.         Initiated By: Ann Martin RN   3464 Handoff to Breanna HENRY

## 2025-03-05 ENCOUNTER — APPOINTMENT (OUTPATIENT)
Dept: INFUSION THERAPY | Facility: CLINIC | Age: 30
End: 2025-03-05
Payer: MEDICAID

## 2025-03-05 VITALS
WEIGHT: 140.98 LBS | HEART RATE: 80 BPM | RESPIRATION RATE: 16 BRPM | BODY MASS INDEX: 27.53 KG/M2 | SYSTOLIC BLOOD PRESSURE: 104 MMHG | OXYGEN SATURATION: 99 % | DIASTOLIC BLOOD PRESSURE: 68 MMHG | TEMPERATURE: 97 F

## 2025-03-05 DIAGNOSIS — K50.80 CROHN'S DISEASE OF BOTH SMALL AND LARGE INTESTINE WITHOUT COMPLICATION (MULTI): ICD-10-CM

## 2025-03-05 PROCEDURE — 96413 CHEMO IV INFUSION 1 HR: CPT | Performed by: REGISTERED NURSE

## 2025-03-05 PROCEDURE — 96415 CHEMO IV INFUSION ADDL HR: CPT | Performed by: REGISTERED NURSE

## 2025-03-05 RX ORDER — ALBUTEROL SULFATE 0.83 MG/ML
3 SOLUTION RESPIRATORY (INHALATION) AS NEEDED
OUTPATIENT
Start: 2025-03-12

## 2025-03-05 RX ORDER — EPINEPHRINE 0.3 MG/.3ML
0.3 INJECTION SUBCUTANEOUS EVERY 5 MIN PRN
OUTPATIENT
Start: 2025-03-12

## 2025-03-05 RX ORDER — FAMOTIDINE 10 MG/ML
20 INJECTION, SOLUTION INTRAVENOUS ONCE AS NEEDED
OUTPATIENT
Start: 2025-03-12

## 2025-03-05 RX ORDER — DIPHENHYDRAMINE HYDROCHLORIDE 50 MG/ML
50 INJECTION INTRAMUSCULAR; INTRAVENOUS AS NEEDED
OUTPATIENT
Start: 2025-03-12

## 2025-03-05 ASSESSMENT — ENCOUNTER SYMPTOMS
TROUBLE SWALLOWING: 0
SHORTNESS OF BREATH: 0
DYSURIA: 0
WOUND: 0
HEADACHES: 0
ABDOMINAL PAIN: 0
SORE THROAT: 0
VOMITING: 0
FATIGUE: 0
BLOOD IN STOOL: 1
UNEXPECTED WEIGHT CHANGE: 0
DIARRHEA: 0
VOICE CHANGE: 0
DIZZINESS: 0
LEG SWELLING: 0
LIGHT-HEADEDNESS: 0
EYE PROBLEMS: 0
HEMATURIA: 0
COUGH: 0
CHILLS: 0
APPETITE CHANGE: 0
FEVER: 0
NAUSEA: 0
WHEEZING: 0
MYALGIAS: 0
FREQUENCY: 0
NUMBNESS: 0
CONSTIPATION: 0
EXTREMITY WEAKNESS: 0
ARTHRALGIAS: 0
PALPITATIONS: 0

## 2025-03-05 NOTE — PATIENT INSTRUCTIONS
Today you received:   inFLIXimab-dyyb (Inflectra) 600 mg in sodium chloride 0.9% 250 mL IV infusion.    Next appt 56 days    For:    1. Crohn's disease of both small and large intestine without complication (Multi)            Please read the  Medication Guide that was given to you and reviewed during todays visit.     (Tell all doctors including dentists that you are taking this medication)     Go to the emergency room or call 911 if:  -You have signs of allergic reaction:   o         Rash, hives, itching.   o         Swollen, blistered, peeling skin.   o         Swelling of face, lips, mouth, tongue or throat.   o         Tightness of chest, trouble breathing, swallowing or talking      Call your doctor:     - If IV / injection site gets red, warm, swollen, itchy or leaks fluid or pus.     (Leave dressing on your IV site for at least 2 hours and keep area clean and dry    - If you get sick or have symptoms of infection or are not feeling well for any reason.    (Wash your hands often, stay away from people who are sick)    - If you have side effects from your medication that do not go away or are bothersome.     (Refer to the teaching your nurse gave you for side effects to call your doctor about)     Common side effects may include:  stuffy nose, headache, feeling tired, muscle aches, upset stomach    - Before receiving any vaccines, Call the [facility] at [phone]     - You get sick, are on antibiotics, have had a recent vaccine, have surgery or dental work and your doctor wants your visit rescheduled.    - You need to cancel and reschedule your visit for any reason. Call at least 2 days before your visit if you need to cancel.     - Your insurance changes before your next visit.    (We will need to get approval from your new insurance. This can take up to two weeks.)     [facility] is opened Monday thru Friday 8am-8pm and Saturday 8am-4:30pm. We are closed on  holidays.     Voice mail will take your call if the center is closed. If you leave a message please allow 24 hours for a call back during weekdays. If you leave a message on a weekend/holiday, we will call you back the next business day.

## 2025-03-05 NOTE — PROGRESS NOTES
[facility]   Infusion Clinic Note   Date: 2025   Name: Ethel Deleon  : 1995   MRN: [id number]         Reason for Visit: OP Infusion (inFLIXimab-dyyb (Inflectra) 600 mg in sodium chloride 0.9% 250 mL IV infusion. Every 56 days. )         Today: We administered inFLIXimab-dyyb (Inflectra) 600 mg in sodium chloride 0.9% 250 mL IV.       Ordered By: Dimple Cedeño MD       For a Diagnosis of: Crohn's disease of both small and large intestine without complication (Multi)       At today's visit patient accompanied by: Parent      Vitals:   Vitals:    25 0900   BP: 115/76   Pulse: 71   Resp: 17   Temp: 36.3 °C (97.3 °F)   SpO2: 98%   Weight: 64 kg (140 lb 15.8 oz)             Pre - Treatment Checklist:      - Previous reaction to current treatment: no      Assess patient for the concerns below. Document provider notification as appropriate.  - Active or recent infection with/without current antibiotic use: no  - Recent or planned invasive dental work: no  - Recent or planned surgeries: no  - Recently received or plans to receive vaccinations: no  - Has treatment related toxicities: no  - Any chance may be pregnant:  no      Pain: 0   - Is the pain different from normal: no   - Is prescribing Doctor aware:  n/a      Labs: Reviewed       Fall Risk Screening: Kyung Fall Risk  History of Falling, Immediate or Within 3 Months: No  Secondary Diagnosis: No  Ambulatory Aid: Walks without aid/bedrest/nurse assist  Intravenous Therapy/Heparin Lock: Yes  Gait/Transferring: Normal/bedrest/immobile  Mental Status: Oriented to own ability  Tracey Fall Risk Score: 20       Review Of Systems:  Review of Systems   Constitutional:  Negative for appetite change, chills, fatigue, fever and unexpected weight change.   HENT:   Negative for hearing loss, mouth sores, sore throat, tinnitus, trouble swallowing and voice change.    Eyes:  Negative for eye problems.   Respiratory:  Negative for  "cough, shortness of breath and wheezing.    Cardiovascular:  Negative for chest pain, leg swelling and palpitations.   Gastrointestinal:  Positive for blood in stool. Negative for abdominal pain, constipation, diarrhea, nausea and vomiting.   Genitourinary:  Negative for dysuria, frequency and hematuria.    Musculoskeletal:  Negative for arthralgias and myalgias.   Skin:  Negative for itching, rash and wound.   Neurological:  Negative for dizziness, extremity weakness, headaches, light-headedness and numbness.         Infusion Readiness:  - Assessment Concerns Related to Infusion: No  - Provider notified: n/a      New Patient Education:    N/A (returning patient for continuation of therapy. Ongoing education provided as needed.)        Treatment Conditions & Drug Specific Questions:    InFLIXimab  (AVSOLA, INFLECTRA, REMICADE, RENFLEXIS)    (Unless otherwise specified on patient specific therapy plan):     TREATMENT CONDITIONS:  Unless otherwise specified on patient specific therapy plan HOLD and notify Provider prior to proceeding with today's infusion if patient with:  o Positive T-Spot  o Reactive Hep B sAg and/or Hep B Core Antibody    Lab Results   Component Value Date    TBSIN Negative 10/18/2023      Lab Results   Component Value Date    HEPBSAG NONREACTIVE 06/28/2023      No results found for: \"NONUHFIRE\", \"NONUHSWGH\", \"NONUHFISH\", \"EXTHEPBSAG\"  Lab Results   Component Value Date    HEPBCAB NONREACTIVE 10/30/2020     Lab Results   Component Value Date    HEPBCAB NONREACTIVE 10/30/2020        Patient meets treatment conditions? Yes    DRUG SPECIFIC QUESTIONS:    Any new or worsening signs / symptoms of heart failure which may include things like worsening shortness of breath, swelling, fatigue? No   (If yes notify provider before proceeding with today's infusion. New onset or worsening HF have been reported with infliximab)    REMINDER:   WEIGHT BASED DRUG     Recommended Vitals/Observation:  Vitals:     " Induction: Obtain vital signs every 30 minutes; at end of observation period and as needed.     Maintenance: Obtain vital signs at start and end of infusions  Observation:     Induction: Patient is monitored for 30 minutes post-infusion      Maintenance: No observation.        Weight Based Drug Calculations:    WEIGHT BASED DRUGS: Infliximab (REMICADE, INFLECTRA, RENFLEXIS)     FLAT DOSE 600 MG.    Patient's weight today:   Vitals:    03/05/25 0900   Weight: 64 kg (140 lb 15.8 oz)            Doses that are weight based have an acceptable variance rule within 10% of the prescribed   order and/or within  weight range. If patient weight on day of infusion falls   outside of the 10% variance, or weight range, infusion is administered and   pharmacy contacted regarding future dosing adjustments, per policy.      fla      Note Authored / Patient Cared for By: Ann Cohen RN

## 2025-04-18 DIAGNOSIS — K50.80 CROHN'S DISEASE OF BOTH SMALL AND LARGE INTESTINE WITHOUT COMPLICATION (MULTI): ICD-10-CM

## 2025-04-30 ENCOUNTER — APPOINTMENT (OUTPATIENT)
Dept: INFUSION THERAPY | Facility: CLINIC | Age: 30
End: 2025-04-30
Payer: MEDICAID

## 2025-04-30 VITALS
BODY MASS INDEX: 27.21 KG/M2 | WEIGHT: 139.33 LBS | HEART RATE: 72 BPM | OXYGEN SATURATION: 98 % | SYSTOLIC BLOOD PRESSURE: 101 MMHG | TEMPERATURE: 97.2 F | RESPIRATION RATE: 16 BRPM | DIASTOLIC BLOOD PRESSURE: 58 MMHG

## 2025-04-30 DIAGNOSIS — K50.80 CROHN'S DISEASE OF BOTH SMALL AND LARGE INTESTINE WITHOUT COMPLICATION (MULTI): ICD-10-CM

## 2025-04-30 PROCEDURE — 96413 CHEMO IV INFUSION 1 HR: CPT | Performed by: REGISTERED NURSE

## 2025-04-30 PROCEDURE — 96415 CHEMO IV INFUSION ADDL HR: CPT | Performed by: REGISTERED NURSE

## 2025-04-30 RX ORDER — EPINEPHRINE 0.3 MG/.3ML
0.3 INJECTION SUBCUTANEOUS EVERY 5 MIN PRN
OUTPATIENT
Start: 2025-06-25

## 2025-04-30 RX ORDER — FAMOTIDINE 10 MG/ML
20 INJECTION, SOLUTION INTRAVENOUS ONCE AS NEEDED
OUTPATIENT
Start: 2025-06-25

## 2025-04-30 RX ORDER — ALBUTEROL SULFATE 0.83 MG/ML
3 SOLUTION RESPIRATORY (INHALATION) AS NEEDED
OUTPATIENT
Start: 2025-06-25

## 2025-04-30 RX ORDER — DIPHENHYDRAMINE HYDROCHLORIDE 50 MG/ML
50 INJECTION, SOLUTION INTRAMUSCULAR; INTRAVENOUS AS NEEDED
OUTPATIENT
Start: 2025-06-25

## 2025-04-30 ASSESSMENT — ENCOUNTER SYMPTOMS
CONSTIPATION: 0
COUGH: 0
NAUSEA: 0
TROUBLE SWALLOWING: 0
NUMBNESS: 0
HEADACHES: 0
DIZZINESS: 0
PALPITATIONS: 0
VOICE CHANGE: 0
ABDOMINAL PAIN: 0
BLOOD IN STOOL: 1
EYE PROBLEMS: 0
FATIGUE: 0
DIARRHEA: 0
WOUND: 0
FREQUENCY: 0
LEG SWELLING: 0
VOMITING: 0
HEMATURIA: 0
CHILLS: 0
UNEXPECTED WEIGHT CHANGE: 0
SORE THROAT: 0
WHEEZING: 0
APPETITE CHANGE: 0
EXTREMITY WEAKNESS: 0
FEVER: 0
ARTHRALGIAS: 0
DYSURIA: 0
LIGHT-HEADEDNESS: 0
SHORTNESS OF BREATH: 0
MYALGIAS: 0

## 2025-04-30 NOTE — PATIENT INSTRUCTIONS
Today :We administered inFLIXimab-dyyb (Inflectra) 600 mg in sodium chloride 0.9% 250 mL IV.     For:   1. Crohn's disease of both small and large intestine without complication (Multi)         Your next appointment is due in:  8 weeks        Please read the  Medication Guide that was given to you and reviewed during todays visit.     (Tell all doctors including dentists that you are taking this medication)     Go to the emergency room or call 911 if:  -You have signs of allergic reaction:   -Rash, hives, itching.   -Swollen, blistered, peeling skin.   -Swelling of face, lips, mouth, tongue or throat.   -Tightness of chest, trouble breathing, swallowing or talking     Call your doctor:  - If IV / injection site gets red, warm, swollen, itchy or leaks fluid or pus.     (Leave dressing on your IV site for at least 2 hours and keep area clean and dry  - If you get sick or have symptoms of infection or are not feeling well for any reason.    (Wash your hands often, stay away from people who are sick)  - If you have side effects from your medication that do not go away or are bothersome.     (Refer to the teaching your nurse gave you for side effects to call your doctor about)    - Common side effects may include:  stuffy nose, headache, feeling tired, muscle aches, upset stomach  - Before receiving any vaccines     - Call the [facility] at   If:  - You get sick, are on antibiotics, have had a recent vaccine, have surgery or dental work and your doctor wants your visit rescheduled.  - You need to cancel and reschedule your visit for any reason. Call at least 2 days before your visit if you need to cancel.   - Your insurance changes before your next visit.    (We will need to get approval from your new insurance. This can take up to two weeks.)     The [facility] is opened Monday thru Friday. We are closed on weekends and holidays.   Voice mail will take your call if the center is closed.  If you leave a message please allow 24 hours for a call back during weekdays. If you leave a message on a weekend/holiday, we will call you back the next business day.    A pharmacist is available Monday - Friday from 8:30AM to 3:30PM to help answer any questions you may have about your prescriptions(s). Please call pharmacy at:    [facility]: [phone]  [facility]: [phone]  [facility]: [phone]

## 2025-04-30 NOTE — PROGRESS NOTES
[facility]   Infusion Clinic Note   Date: 2025   Name: Ethel Deleon  : 1995   MRN: [id number]         Reason for Visit: OP Infusion (Pt here for Q 8 week Inflectra infusion)         Today: We administered inFLIXimab-dyyb (Inflectra) 600 mg in sodium chloride 0.9% 250 mL IV.       Ordered By: Dimple Cedeño MD       For a Diagnosis of: Crohn's disease of both small and large intestine without complication (Multi)       At today's visit patient accompanied by: Parent      Vitals:   Vitals:    25 0759 25 1000   BP: 105/68 101/58   Pulse: 78 72   Resp: 16 16   Temp: 36.2 °C (97.2 °F) 36.2 °C (97.2 °F)   SpO2: 99% 98%   Weight: 63.2 kg (139 lb 5.3 oz)              Pre - Treatment Checklist:      - Previous reaction to current treatment: no      Assess patient for the concerns below. Document provider notification as appropriate.  - Active or recent infection with/without current antibiotic use: no  - Recent or planned invasive dental work: no  - Recent or planned surgeries: no  - Recently received or plans to receive vaccinations: no  - Has treatment related toxicities: no  - Any chance may be pregnant:  no      Pain: 0   - Is the pain different from normal: no   - Is prescribing Doctor aware:  n/a      Labs: Reviewed       Fall Risk Screening: Tracey Fall Risk  History of Falling, Immediate or Within 3 Months: No  Secondary Diagnosis: No  Ambulatory Aid: Walks without aid/bedrest/nurse assist  Intravenous Therapy/Heparin Lock: Yes  Gait/Transferring: Normal/bedrest/immobile  Mental Status: Oriented to own ability  Tracey Fall Risk Score: 20       Review Of Systems:  Review of Systems   Constitutional:  Negative for appetite change, chills, fatigue, fever and unexpected weight change.   HENT:   Negative for hearing loss, mouth sores, sore throat, tinnitus, trouble swallowing and voice change.    Eyes:  Negative for eye problems.   Respiratory:  Negative for  "cough, shortness of breath and wheezing.    Cardiovascular:  Negative for chest pain, leg swelling and palpitations.   Gastrointestinal:  Positive for blood in stool. Negative for abdominal pain, constipation, diarrhea, nausea and vomiting.   Genitourinary:  Negative for dysuria, frequency and hematuria.    Musculoskeletal:  Negative for arthralgias and myalgias.   Skin:  Negative for itching, rash and wound.   Neurological:  Negative for dizziness, extremity weakness, headaches, light-headedness and numbness.         Infusion Readiness:  - Assessment Concerns Related to Infusion: No  - Provider notified: n/a      New Patient Education:    N/A (returning patient for continuation of therapy. Ongoing education provided as needed.)        Treatment Conditions & Drug Specific Questions:    InFLIXimab  (AVSOLA, INFLECTRA, REMICADE, RENFLEXIS)    (Unless otherwise specified on patient specific therapy plan):     TREATMENT CONDITIONS:  Unless otherwise specified on patient specific therapy plan HOLD and notify Provider prior to proceeding with today's infusion if patient with:  o Positive T-Spot  o Reactive Hep B sAg and/or Hep B Core Antibody    Lab Results   Component Value Date    TBSIN Negative 10/18/2023      Lab Results   Component Value Date    HEPBSAG NONREACTIVE 06/28/2023      No results found for: \"NONUHFIRE\", \"NONUHSWGH\", \"NONUHFISH\", \"EXTHEPBSAG\"  Lab Results   Component Value Date    HEPBCAB NONREACTIVE 10/30/2020     Lab Results   Component Value Date    HEPBCAB NONREACTIVE 10/30/2020        Patient meets treatment conditions? Yes    DRUG SPECIFIC QUESTIONS:    Any new or worsening signs / symptoms of heart failure which may include things like worsening shortness of breath, swelling, fatigue? No   (If yes notify provider before proceeding with today's infusion. New onset or worsening HF have been reported with infliximab)    REMINDER:   WEIGHT BASED DRUG     Recommended Vitals/Observation:  Vitals:     " Induction: Obtain vital signs every 30 minutes; at end of observation period and as needed.     Maintenance: Obtain vital signs at start and end of infusions  Observation:     Induction: Patient is monitored for 30 minutes post-infusion      Maintenance: No observation.        Weight Based Drug Calculations:    WEIGHT BASED DRUGS: Infliximab (REMICADE, INFLECTRA, RENFLEXIS)     FLAT DOSE 600 MG.    Patient's weight today:   Vitals:    04/30/25 0759   Weight: 63.2 kg (139 lb 5.3 oz)            Doses that are weight based have an acceptable variance rule within 10% of the prescribed   order and/or within  weight range. If patient weight on day of infusion falls   outside of the 10% variance, or weight range, infusion is administered and   pharmacy contacted regarding future dosing adjustments, per policy.      fla      Note Authored / Patient Cared for By: Nelia Quijano RN

## 2025-05-07 ENCOUNTER — APPOINTMENT (OUTPATIENT)
Dept: PEDIATRIC NEUROLOGY | Facility: CLINIC | Age: 30
End: 2025-05-07
Payer: MEDICAID

## 2025-05-07 VITALS
HEART RATE: 78 BPM | TEMPERATURE: 97.8 F | DIASTOLIC BLOOD PRESSURE: 75 MMHG | HEIGHT: 61 IN | SYSTOLIC BLOOD PRESSURE: 123 MMHG | RESPIRATION RATE: 20 BRPM | BODY MASS INDEX: 26.53 KG/M2 | WEIGHT: 140.54 LBS

## 2025-05-07 DIAGNOSIS — F84.5 ASPERGER SYNDROME: ICD-10-CM

## 2025-05-07 DIAGNOSIS — F41.1 GENERALIZED ANXIETY DISORDER: Primary | ICD-10-CM

## 2025-05-07 DIAGNOSIS — F90.0 ATTENTION DEFICIT HYPERACTIVITY DISORDER (ADHD), PREDOMINANTLY INATTENTIVE TYPE: ICD-10-CM

## 2025-05-07 PROCEDURE — 3008F BODY MASS INDEX DOCD: CPT | Performed by: NURSE PRACTITIONER

## 2025-05-07 PROCEDURE — 99213 OFFICE O/P EST LOW 20 MIN: CPT | Performed by: NURSE PRACTITIONER

## 2025-05-07 RX ORDER — METHYLPHENIDATE HYDROCHLORIDE 10 MG/1
10 CAPSULE, EXTENDED RELEASE ORAL EVERY MORNING
Qty: 30 CAPSULE | Refills: 0 | Status: SHIPPED | OUTPATIENT
Start: 2025-05-07

## 2025-05-07 RX ORDER — METHYLPHENIDATE HYDROCHLORIDE 10 MG/1
10 CAPSULE, EXTENDED RELEASE ORAL EVERY MORNING
Qty: 30 CAPSULE | Refills: 0 | Status: SHIPPED | OUTPATIENT
Start: 2025-07-07

## 2025-05-07 RX ORDER — METHYLPHENIDATE HYDROCHLORIDE 10 MG/1
10 CAPSULE, EXTENDED RELEASE ORAL EVERY MORNING
Qty: 30 CAPSULE | Refills: 0 | Status: SHIPPED | OUTPATIENT
Start: 2025-06-07

## 2025-05-07 ASSESSMENT — ENCOUNTER SYMPTOMS
OCCASIONAL FEELINGS OF UNSTEADINESS: 0
DEPRESSION: 0
LOSS OF SENSATION IN FEET: 0

## 2025-05-07 ASSESSMENT — PAIN SCALES - GENERAL: PAINLEVEL_OUTOF10: 0-NO PAIN

## 2025-05-07 NOTE — LETTER
May 7, 2025     Sharona Gaona DO  [address] [facility] [id number]    Patient: Shaniqua Deleon   YOB: 1995   Date of Visit: 5/7/2025       Dear Dr. Sharona Gaona DO:    Thank you for referring Shaniqua Deleon to me for evaluation. Below are my notes for this consultation.  If you have questions, please do not hesitate to call me. I look forward to following your patient along with you.       Sincerely,     Alma Rosa Irizarry, APRN-CNP, APRN-CNS      CC: No Recipients  ______________________________________________________________________________________    Subjective  Shaniqua Deleon is a 29 y.o.   female.  MELYSSA Davenport is an almost 29 year old young woman with Asperger syndrome, ADHD and anxiety. She was last seen in May, 2024.    She helped her mom after her knee replacement.      She is still working at [employer] and she is working 4 days a week. She likes what she does and does a great job. She handles her job frustration quite well. On Thursdays she is attending art class. 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 She has been at [employer] 6 years.      She continues to live upstairs at her mom's and she like her autonomy. She is glad that mom can now get out of the house and is back to work.     She tells me about the kitchen remodel.      Her Penny's is improving, she does not need another colonoscopy for a year or two.      She is still taking Fluoxetine 30 mg daily and Metadate CD 10 mg daily for work. No medication side effects are reported. She has done better with the higher dose.     A review of systems finds no other pertinent positives.   Objective  Neurological Exam  Mental Status  Awake and alert. Oriented to person, place, time and situation. Speech is normal. Language is fluent with no aphasia. Attention and concentration are normal. Fund of knowledge is appropriate for level of education.    Cranial Nerves  CN III, IV, VI: Pupils equal round and  reactive to light bilaterally.  CN V: Facial sensation is normal.  CN VII: Full and symmetric facial movement.  CN VIII: Hearing is normal.  CN IX, X: Palate elevates symmetrically  CN XI: Shoulder shrug strength is normal.  CN XII: Tongue midline without atrophy or fasciculations.    Motor  Normal muscle bulk throughout. Normal muscle tone. Strength is 5/5 throughout all four extremities.    Sensory  Light touch is normal in upper and lower extremities.     Reflexes                                            Right                      Left  Brachioradialis                    2+                         2+  Biceps                                 2+                         2+  Patellar                                2+                         2+  Achilles                                2+                         2+    Coordination  Right: Rapid alternating movement normal.Left: Rapid alternating movement normal.    Gait  Casual gait is normal including stance, stride, and arm swing.    Physical Exam  Constitutional:       General: She is awake.   Eyes:      Pupils: Pupils are equal, round, and reactive to light.   Neurological:      Mental Status: She is alert.      Motor: Motor strength is normal.     Deep Tendon Reflexes:      Reflex Scores:       Bicep reflexes are 2+ on the right side and 2+ on the left side.       Brachioradialis reflexes are 2+ on the right side and 2+ on the left side.       Patellar reflexes are 2+ on the right side and 2+ on the left side.       Achilles reflexes are 2+ on the right side and 2+ on the left side.  Psychiatric:         Speech: Speech normal.         Assessment/Plan  You are doing quite well. Mood is good and you are doing a great job at work and socially. Sleep has been OK. 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  I have talked with you about the followin. Please continue with current Metadate dose. Refills will be provided.  2. Please continue with current Prozac dose.  Refills will be provided.   3. Please monitor anxiety.   4. Please call with an update on you progress.  5. Follow-up will be in one year.  6. Stimulant use discussed.   7. Guardianship forms will be completed.

## 2025-05-07 NOTE — PATIENT INSTRUCTIONS
You are doing quite well. Mood is good and you are doing a great job at work and socially. Sleep has been OK. 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  I have talked with you about the followin. Please continue with current Metadate dose. Refills will be provided.  2. Please continue with current Prozac dose. Refills will be provided.   3. Please monitor anxiety.   4. Please call with an update on you progress.  5. Follow-up will be in one year.  6. Stimulant use discussed.   7. Guardianship forms will be completed.

## 2025-05-07 NOTE — PROGRESS NOTES
Laine Deleon is a 29 y.o.   female.  MELYSSA Davenport is an almost 29 year old young woman with Asperger syndrome, ADHD and anxiety. She was last seen in May, 2024.    She helped her mom after her knee replacement.      She is still working at [employer] and she is working 4 days a week. She likes what she does and does a great job. She handles her job frustration quite well. On Thursdays she is attending art class. 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 She has been at [employer] 6 years.      She continues to live upstairs at her mom's and she like her autonomy. She is glad that mom can now get out of the house and is back to work.     She tells me about the kitchen remodel.      Her Penny's is improving, she does not need another colonoscopy for a year or two.      She is still taking Fluoxetine 30 mg daily and Metadate CD 10 mg daily for work. No medication side effects are reported. She has done better with the higher dose.     A review of systems finds no other pertinent positives.   Objective   Neurological Exam  Mental Status  Awake and alert. Oriented to person, place, time and situation. Speech is normal. Language is fluent with no aphasia. Attention and concentration are normal. Fund of knowledge is appropriate for level of education.    Cranial Nerves  CN III, IV, VI: Pupils equal round and reactive to light bilaterally.  CN V: Facial sensation is normal.  CN VII: Full and symmetric facial movement.  CN VIII: Hearing is normal.  CN IX, X: Palate elevates symmetrically  CN XI: Shoulder shrug strength is normal.  CN XII: Tongue midline without atrophy or fasciculations.    Motor  Normal muscle bulk throughout. Normal muscle tone. Strength is 5/5 throughout all four extremities.    Sensory  Light touch is normal in upper and lower extremities.     Reflexes                                            Right                      Left  Brachioradialis                     2+                         2+  Biceps                                 2+                         2+  Patellar                                2+                         2+  Achilles                                2+                         2+    Coordination  Right: Rapid alternating movement normal.Left: Rapid alternating movement normal.    Gait  Casual gait is normal including stance, stride, and arm swing.    Physical Exam  Constitutional:       General: She is awake.   Eyes:      Pupils: Pupils are equal, round, and reactive to light.   Neurological:      Mental Status: She is alert.      Motor: Motor strength is normal.     Deep Tendon Reflexes:      Reflex Scores:       Bicep reflexes are 2+ on the right side and 2+ on the left side.       Brachioradialis reflexes are 2+ on the right side and 2+ on the left side.       Patellar reflexes are 2+ on the right side and 2+ on the left side.       Achilles reflexes are 2+ on the right side and 2+ on the left side.  Psychiatric:         Speech: Speech normal.         Assessment/Plan   You are doing quite well. Mood is good and you are doing a great job at work and socially. Sleep has been OK. 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  I have talked with you about the followin. Please continue with current Metadate dose. Refills will be provided.  2. Please continue with current Prozac dose. Refills will be provided.   3. Please monitor anxiety.   4. Please call with an update on you progress.  5. Follow-up will be in one year.  6. Stimulant use discussed.   7. Guardianship forms will be completed.

## 2025-05-08 RX ORDER — HYDROCORTISONE ACETATE 25 MG/1
SUPPOSITORY RECTAL
COMMUNITY
Start: 2024-11-03

## 2025-05-12 NOTE — PROGRESS NOTES
"REASON FOR VISIT:   Crohn's disease    HPI:  Ethel Deleon is a 29 y.o. female who presents for 6-month follow-up for Crohn's disease.  She is currently on Inflectra 10 mg/kg every 8 weeks.  Last seen by Dr Cedeño in 11/24. Last Remicade 4/30/25.     Moves her bowels every other day  Denies hematochezia or melena, urgency or diarrhea  Has rare episodes of blood on tissue or toilet  Uses HC suppository occasionally - helps   Occasional headaches - L or R - 3x a year   Denies abdominal pain, nausea, vomiting, reflux or dysphagia  Weight and appetite are stable  Denies eye pain, visual changes, joint pain or swelling, skin rashes, lesions or oral sores    Med list notable for Inflectra    Labs   Lab Results   Component Value Date    WBC 4.7 11/20/2024    HGB 14.9 11/20/2024    HCT 41.8 11/20/2024    MCV 92 11/20/2024     11/20/2024     Lab Results   Component Value Date     11/20/2024    K 4.1 11/20/2024     11/20/2024    CO2 28 11/20/2024    BUN 12 11/20/2024    CREATININE 0.65 11/20/2024    GLUCOSE 120 (H) 11/20/2024    CALCIUM 9.7 11/20/2024      Lab Results   Component Value Date    ALKPHOS 69 11/20/2024    ALKPHOS 56 05/03/2023    ALKPHOS 68 11/09/2022    BILITOT 0.4 11/20/2024    BILITOT 0.5 05/03/2023    BILITOT 0.4 11/09/2022    PROT 7.2 11/20/2024    PROT 7.1 05/03/2023    PROT 7.1 11/09/2022    ALT 15 11/20/2024    ALT 13 05/03/2023    ALT 14 11/09/2022    AST 17 11/20/2024    AST 15 05/03/2023    AST 16 11/09/2022      No results found for: \"INR\"   Lab Results   Component Value Date    IRON 27 (L) 02/24/2021    TIBC 420 02/24/2021      Lab Results   Component Value Date    FERRITIN <8 (A) 02/24/2021      No results found for: \"TSH\", \"T3FREE\", \"FREET4\"       No fhx of CRC.       Previous Endoscopic evaluation:     Colonoscopy-6/20/2023-Dr. Cedeño    Impression-simple endoscopic score for Crohn's disease- 0   Mucosal inflammatory changes secondary to quiescent Crohn's disease.  " Biopsies.  Pseudopolyps in the transverse colon and in the ascending colon.  Recommended to repeat in 3 years- 6/2026      REVIEW OF SYSTEMS    Review of Systems   Constitutional:  Negative for appetite change and unexpected weight change.   HENT:  Negative for mouth sores.    Eyes: Negative.  Negative for pain and visual disturbance.   Respiratory:  Negative for cough and shortness of breath.    Cardiovascular:  Negative for chest pain.   Endocrine: Negative.    Genitourinary:  Negative for difficulty urinating.   Musculoskeletal:  Negative for arthralgias, joint swelling and myalgias.   Skin:  Negative for rash.   Allergic/Immunologic: Negative for environmental allergies and food allergies.   Psychiatric/Behavioral:  The patient is not nervous/anxious.           Allergies[1]    Medical History[2]    Surgical History[3]    Family History[4]    Social History     Tobacco Use    Smoking status: Never    Smokeless tobacco: Never   Substance Use Topics    Alcohol use: Never       Current Medications[5]    PHYSICAL EXAM:  There were no vitals taken for this visit.     Physical Exam  Constitutional:       Appearance: Normal appearance.   HENT:      Head: Normocephalic and atraumatic.      Nose: Nose normal.   Eyes:      Pupils: Pupils are equal, round, and reactive to light.   Cardiovascular:      Rate and Rhythm: Normal rate and regular rhythm.   Pulmonary:      Effort: Pulmonary effort is normal.      Breath sounds: Normal breath sounds.   Abdominal:      General: Abdomen is flat. Bowel sounds are normal. There is no distension.      Palpations: Abdomen is soft. There is no mass.      Tenderness: There is no abdominal tenderness. There is no guarding or rebound.      Hernia: No hernia is present.   Musculoskeletal:         General: Normal range of motion.      Cervical back: Normal range of motion and neck supple.   Skin:     General: Skin is warm and dry.   Neurological:      Mental Status: She is alert and oriented  to person, place, and time.   Psychiatric:         Mood and Affect: Mood normal.         Behavior: Behavior normal.          ASSESSMENT  29-year-old female with a history of Crohn's disease diagnosed in 2020.  Initially diagnosed with perianal disease, small bowel and large bowel disease.  She is currently on infliximab 10mg/kg infusions every 8 weeks.  Her last drug level was 8.8 (11/24) with no evidence of antidrug antibody.  She is here for 6-month follow-up appointment today.  She has been doing well overall.  Her most recent colonoscopy was in 2023 and it was consistent with an SES Crohn's score of 0.  She will be due for surveillance colonoscopy in 2026.    She reports headaches 3 times a year that appear to be related to her menstrual cycle.  She has rare episodes of blood on the toilet tissue or toilet bowl and uses hydrocortisone rectal suppository with good relief.  She generally moves her bowels every other day.  She denies any extraintestinal manifestations of IBD.  She is overdue for TB test.  She is due for 6-month blood tests.       PLAN    Check labs including CBC, CMP, ESR, CRP and TB test.   Follow-up with Dr. Cedeño in the GI clinic in 6 months   Continue infliximab 10 mg/kg infusion every 8 weeks      Torrie TRAORE Robert, APRN-CNP      Date: 5/13/2025  Time: 10:42AM          [1] No Known Allergies  [2]   Past Medical History:  Diagnosis Date    Other conditions influencing health status 09/15/2020    No significant past medical history   [3]   Past Surgical History:  Procedure Laterality Date    OTHER SURGICAL HISTORY  08/23/2018    Complex / Multiple Fistula-in-ano Repair With Placement Of Seton   [4]   Family History  Problem Relation Name Age of Onset    No Known Problems Mother      No Known Problems Father     [5]   Current Outpatient Medications   Medication Sig Dispense Refill    ferrous sulfate, 325 mg ferrous sulfate, (FeroSuL) tablet Take 1 tablet by mouth once daily. 90 tablet 3     FLUoxetine (PROzac) 10 mg tablet Take 3 tablets (30 mg) by mouth once daily. 90 tablet 11    hydrocortisone (Anusol-HC) 25 mg suppository UNWRAP AND INSERT ONE SUPPOSITORY (25 MG) INTO THE RECTUM 2 TIMES A DAY AS NEEDED FOR HEMMORRHOIDS UP TO 12 DAYS.      inFLIXimab-dyyb (Inflectra) 100 mg injection Inflectra 100 MG Intravenous Solution Reconstituted   Refills: 0        Start : 13-Apr-2021   Active      inFLIXimab-dyyb (Inflectra) 100 mg injection Infuse 600 mg into a venous catheter every 8 (eight) weeks.  FOR AIC NR 6 each 3    mesalamine (Canasa) 1,000 mg suppository Insert into the rectum. UNWRAP AND INSERT ONE SUPPOSITORY RECTALLY EVERY NIGHT AT BEDTIME FOR3 WEEKS THEN EVERY OTHER BEDTIME AS NEEDED      methylphenidate (Ritalin) 10 mg tablet Take 1 tablet (10 mg) by mouth once daily. 30 tablet 0    methylphenidate CD (Metadate CD) 10 mg daily capsule Take 1 capsule (10 mg) by mouth once daily in the morning. 30 capsule 0    [START ON 6/7/2025] methylphenidate CD (Metadate CD) 10 mg daily capsule Take 1 capsule (10 mg) by mouth once daily in the morning. Do not fill before June 7, 2025. 30 capsule 0    [START ON 7/7/2025] methylphenidate CD (Metadate CD) 10 mg daily capsule Take 1 capsule (10 mg) by mouth once daily in the morning. Do not fill before July 7, 2025. 30 capsule 0    nystatin (Mycostatin) cream Nystatin 270418 UNIT/GM External Cream   Quantity: 30  Refills: 0        Start : 10-Aug-2021   Active       No current facility-administered medications for this visit.

## 2025-05-13 ENCOUNTER — APPOINTMENT (OUTPATIENT)
Dept: GASTROENTEROLOGY | Facility: CLINIC | Age: 30
End: 2025-05-13
Payer: MEDICAID

## 2025-05-13 VITALS
BODY MASS INDEX: 26.66 KG/M2 | OXYGEN SATURATION: 96 % | HEIGHT: 61 IN | TEMPERATURE: 97.3 F | WEIGHT: 141.2 LBS | SYSTOLIC BLOOD PRESSURE: 105 MMHG | DIASTOLIC BLOOD PRESSURE: 68 MMHG | HEART RATE: 62 BPM

## 2025-05-13 DIAGNOSIS — K50.811 CROHN'S DISEASE OF BOTH SMALL AND LARGE INTESTINE WITH RECTAL BLEEDING (MULTI): Primary | ICD-10-CM

## 2025-05-13 PROCEDURE — 3008F BODY MASS INDEX DOCD: CPT | Performed by: REGISTERED NURSE

## 2025-05-13 PROCEDURE — 1036F TOBACCO NON-USER: CPT | Performed by: REGISTERED NURSE

## 2025-05-13 PROCEDURE — 99214 OFFICE O/P EST MOD 30 MIN: CPT | Performed by: REGISTERED NURSE

## 2025-05-13 ASSESSMENT — ENCOUNTER SYMPTOMS
MYALGIAS: 0
NERVOUS/ANXIOUS: 0
JOINT SWELLING: 0
ENDOCRINE NEGATIVE: 1
COUGH: 0
DIFFICULTY URINATING: 0
UNEXPECTED WEIGHT CHANGE: 0
ARTHRALGIAS: 0
EYE PAIN: 0
EYES NEGATIVE: 1
SHORTNESS OF BREATH: 0
APPETITE CHANGE: 0

## 2025-05-18 DIAGNOSIS — D50.8 OTHER IRON DEFICIENCY ANEMIAS: ICD-10-CM

## 2025-05-19 RX ORDER — FERROUS SULFATE 325(65) MG
1 TABLET ORAL DAILY
Qty: 90 TABLET | Refills: 0 | Status: SHIPPED | OUTPATIENT
Start: 2025-05-19

## 2025-06-06 LAB
ALBUMIN SERPL-MCNC: 4.3 G/DL (ref 3.6–5.1)
ALP SERPL-CCNC: 59 U/L (ref 31–125)
ALT SERPL-CCNC: 13 U/L (ref 6–29)
ANION GAP SERPL CALCULATED.4IONS-SCNC: 8 MMOL/L (CALC) (ref 7–17)
AST SERPL-CCNC: 15 U/L (ref 10–30)
BILIRUB SERPL-MCNC: 0.4 MG/DL (ref 0.2–1.2)
BUN SERPL-MCNC: 13 MG/DL (ref 7–25)
CALCIUM SERPL-MCNC: 8.9 MG/DL (ref 8.6–10.2)
CHLORIDE SERPL-SCNC: 103 MMOL/L (ref 98–110)
CO2 SERPL-SCNC: 27 MMOL/L (ref 20–32)
CREAT SERPL-MCNC: 0.61 MG/DL (ref 0.5–0.97)
CRP SERPL-MCNC: <3 MG/L
EGFRCR SERPLBLD CKD-EPI 2021: 123 ML/MIN/1.73M2
ERYTHROCYTE [DISTWIDTH] IN BLOOD BY AUTOMATED COUNT: 14 % (ref 11–15)
ERYTHROCYTE [SEDIMENTATION RATE] IN BLOOD BY WESTERGREN METHOD: 6 MM/H
GLUCOSE SERPL-MCNC: 84 MG/DL (ref 65–99)
HCT VFR BLD AUTO: 41.1 % (ref 35–45)
HGB BLD-MCNC: 13.1 G/DL (ref 11.7–15.5)
IGNF NEG CNTRL BLD: NORMAL
M TB IFN-G BLD-IMP: NEGATIVE
MCH RBC QN AUTO: 31 PG (ref 27–33)
MCHC RBC AUTO-ENTMCNC: 31.9 G/DL (ref 32–36)
MCV RBC AUTO: 97.4 FL (ref 80–100)
MITOGEN IGNF.SPOT COUNT BLD: NORMAL
PLATELET # BLD AUTO: 272 THOUSAND/UL (ref 140–400)
PMV BLD REES-ECKER: 10.8 FL (ref 7.5–12.5)
POTASSIUM SERPL-SCNC: 4.4 MMOL/L (ref 3.5–5.3)
PROT SERPL-MCNC: 6.6 G/DL (ref 6.1–8.1)
QUEST PANEL A SPOT COUNT: 0
QUEST PANEL B SPOT COUNT: 1
RBC # BLD AUTO: 4.22 MILLION/UL (ref 3.8–5.1)
SODIUM SERPL-SCNC: 138 MMOL/L (ref 135–146)
WBC # BLD AUTO: 6.1 THOUSAND/UL (ref 3.8–10.8)

## 2025-06-08 ENCOUNTER — PATIENT MESSAGE (OUTPATIENT)
Dept: GASTROENTEROLOGY | Facility: CLINIC | Age: 30
End: 2025-06-08
Payer: MEDICAID

## 2025-06-08 DIAGNOSIS — K64.8 BLEEDING INTERNAL HEMORRHOIDS: Primary | ICD-10-CM

## 2025-06-09 RX ORDER — HYDROCORTISONE ACETATE 25 MG/1
25 SUPPOSITORY RECTAL 2 TIMES DAILY
Qty: 12 SUPPOSITORY | Refills: 3 | Status: SHIPPED | OUTPATIENT
Start: 2025-06-09

## 2025-06-10 ENCOUNTER — TELEPHONE (OUTPATIENT)
Dept: GASTROENTEROLOGY | Facility: CLINIC | Age: 30
End: 2025-06-10
Payer: MEDICAID

## 2025-06-10 NOTE — TELEPHONE ENCOUNTER
Patient's mother called in to let us know that she received your message but advised that they already picked up the medication. She said she really appreciates us trying to help get it cheaper for them.

## 2025-06-10 NOTE — TELEPHONE ENCOUNTER
Medicaid will not cover the suppository. It's an excluded medication.     I'm not sure if there is anything else she can be given instead.

## 2025-06-24 ENCOUNTER — OFFICE VISIT (OUTPATIENT)
Dept: GASTROENTEROLOGY | Facility: CLINIC | Age: 30
End: 2025-06-24
Payer: MEDICAID

## 2025-06-24 VITALS
DIASTOLIC BLOOD PRESSURE: 73 MMHG | BODY MASS INDEX: 27.09 KG/M2 | WEIGHT: 138 LBS | SYSTOLIC BLOOD PRESSURE: 114 MMHG | OXYGEN SATURATION: 97 % | HEART RATE: 68 BPM | TEMPERATURE: 97.5 F | RESPIRATION RATE: 16 BRPM | HEIGHT: 60 IN

## 2025-06-24 DIAGNOSIS — K50.80 CROHN'S DISEASE OF BOTH SMALL AND LARGE INTESTINE WITHOUT COMPLICATION (MULTI): ICD-10-CM

## 2025-06-24 DIAGNOSIS — K62.5 RECTAL HEMORRHAGE: Primary | ICD-10-CM

## 2025-06-24 PROCEDURE — 99214 OFFICE O/P EST MOD 30 MIN: CPT | Performed by: INTERNAL MEDICINE

## 2025-06-24 PROCEDURE — 1036F TOBACCO NON-USER: CPT | Performed by: INTERNAL MEDICINE

## 2025-06-24 PROCEDURE — 3008F BODY MASS INDEX DOCD: CPT | Performed by: INTERNAL MEDICINE

## 2025-06-24 ASSESSMENT — ENCOUNTER SYMPTOMS
BLOOD IN STOOL: 1
DIARRHEA: 0
CONSTIPATION: 0
ABDOMINAL DISTENTION: 0
ABDOMINAL PAIN: 0

## 2025-06-24 NOTE — PROGRESS NOTES
"Subjective   Patient ID: Ethel Deleon \"Rachel" is a 30 y.o. female who presents for persistent rectal bleeding.      diagnosed with Crohn's disease diagnosed in 2020. She was initially diagnosed with perianal disease small bowel and large bowel disease. She was started on Infliximab  infusions every 8 weeks.   History of Present Illness  The patient presents for evaluation of rectal bleeding. She is accompanied by her mother.    She reports the presence of blood on the toilet paper following bowel movements, which she describes as painful and incomplete, likening the sensation to that of passing gas. She does not experience any sharp pain during these episodes. Occasionally, she experiences abdominal discomfort when evacuating a significant amount of stool, but this is not accompanied by cramping or sharp pain. She also reports no nausea or vomiting. There have been no skin rashes or joint pain.     She has been using a squatty potty to aid in bowel movements and is considering the use of hemorrhoid creams. MiraLAX has been taken at a dosage of 3 teaspoons per day, which she plans to reduce to 2 teaspoons to avoid overly soft stools.    She has been experiencing arm pain, which she manages with B complex vitamins until the pain subsides.    SOCIAL HISTORY  Occupation: Works 18 hours a week    Med list notable for    Labs notable for    No fhx of CRC.       Prev endoscopic eval:     Review of Systems   Gastrointestinal:  Positive for blood in stool. Negative for abdominal distention, abdominal pain, constipation and diarrhea.       Objective     /73   Pulse 68   Temp 36.4 °C (97.5 °F)   Resp 16   Ht 1.524 m (5')   Wt 62.6 kg (138 lb)   SpO2 97%   BMI 26.95 kg/m²      Physical Exam  Genitourinary:     Comments: Skin tags, small hemorrhoid -no thrombosis, no anal fissure noted, no fluctuance, tenderness           Assessment & Plan  1.  Crohns disease - on infliximab with perisistent Rectal bleeding " -reviewed phone images - brown stool with BRB in water and tp:  - Likely due to hemorrhoid, causing engorgement and irritation during bowel movements.  - Absence of fissure or mass is reassuring.  - Continue sitz baths without additional substances.  - Use Preparation H cream or Tucks pads after bowel movements.  - Avoid prolonged sitting on the toilet and excessive straining.  - Reduce MiraLAX dosage to half a capful or 2 teaspoons to prevent overly soft stools.  - Scheduled for infusion tomorrow.  - Colonoscopy planned for next year unless condition changes.      Follow-up: November as scheduled.    Dimple Cedeño MD     This medical note was created with the assistance of artificial intelligence (AI) for documentation purposes. The content has been reviewed and confirmed by the healthcare provider for accuracy and completeness. Patient consented to the use of audio recording and use of AI during their visit.

## 2025-06-25 ENCOUNTER — APPOINTMENT (OUTPATIENT)
Dept: INFUSION THERAPY | Facility: CLINIC | Age: 30
End: 2025-06-25
Payer: MEDICAID

## 2025-06-25 VITALS
SYSTOLIC BLOOD PRESSURE: 117 MMHG | OXYGEN SATURATION: 98 % | DIASTOLIC BLOOD PRESSURE: 69 MMHG | HEART RATE: 68 BPM | RESPIRATION RATE: 16 BRPM | WEIGHT: 139.33 LBS | BODY MASS INDEX: 27.21 KG/M2

## 2025-06-25 DIAGNOSIS — K50.80 CROHN'S DISEASE OF BOTH SMALL AND LARGE INTESTINE WITHOUT COMPLICATION (MULTI): ICD-10-CM

## 2025-06-25 PROCEDURE — 96415 CHEMO IV INFUSION ADDL HR: CPT | Performed by: REGISTERED NURSE

## 2025-06-25 PROCEDURE — 96413 CHEMO IV INFUSION 1 HR: CPT | Performed by: REGISTERED NURSE

## 2025-06-25 RX ORDER — ALBUTEROL SULFATE 0.83 MG/ML
3 SOLUTION RESPIRATORY (INHALATION) AS NEEDED
OUTPATIENT
Start: 2025-08-20

## 2025-06-25 RX ORDER — EPINEPHRINE 0.3 MG/.3ML
0.3 INJECTION SUBCUTANEOUS EVERY 5 MIN PRN
OUTPATIENT
Start: 2025-08-20

## 2025-06-25 RX ORDER — DIPHENHYDRAMINE HYDROCHLORIDE 50 MG/ML
50 INJECTION, SOLUTION INTRAMUSCULAR; INTRAVENOUS AS NEEDED
OUTPATIENT
Start: 2025-08-20

## 2025-06-25 RX ORDER — FAMOTIDINE 10 MG/ML
20 INJECTION, SOLUTION INTRAVENOUS ONCE AS NEEDED
OUTPATIENT
Start: 2025-08-20

## 2025-06-25 ASSESSMENT — ENCOUNTER SYMPTOMS
MYALGIAS: 0
FREQUENCY: 0
PALPITATIONS: 0
DIARRHEA: 0
BRUISES/BLEEDS EASILY: 0
SORE THROAT: 0
WOUND: 0
FATIGUE: 0
ABDOMINAL PAIN: 0
DIZZINESS: 0
SHORTNESS OF BREATH: 0
CONSTIPATION: 0
COUGH: 0
TROUBLE SWALLOWING: 0
LEG SWELLING: 0
FEVER: 0
CHILLS: 0
LIGHT-HEADEDNESS: 0
UNEXPECTED WEIGHT CHANGE: 0
VOMITING: 0
DYSURIA: 0
ARTHRALGIAS: 0
NAUSEA: 0
EYE PROBLEMS: 0
EXTREMITY WEAKNESS: 0
WHEEZING: 0
VOICE CHANGE: 0
HEADACHES: 0
NUMBNESS: 0
APPETITE CHANGE: 0
HEMATURIA: 0
BLOOD IN STOOL: 0

## 2025-06-25 NOTE — PATIENT INSTRUCTIONS
Today :We administered inFLIXimab-dyyb (Inflectra) 600 mg in sodium chloride 0.9% 250 mL IV.     For:   1. Crohn's disease of both small and large intestine without complication (Multi)         Your next appointment is due in:  56 days        Please read the  Medication Guide that was given to you and reviewed during todays visit.     (Tell all doctors including dentists that you are taking this medication)     Go to the emergency room or call 911 if:  -You have signs of allergic reaction:   -Rash, hives, itching.   -Swollen, blistered, peeling skin.   -Swelling of face, lips, mouth, tongue or throat.   -Tightness of chest, trouble breathing, swallowing or talking     Call your doctor:  - If IV / injection site gets red, warm, swollen, itchy or leaks fluid or pus.     (Leave dressing on your IV site for at least 2 hours and keep area clean and dry  - If you get sick or have symptoms of infection or are not feeling well for any reason.    (Wash your hands often, stay away from people who are sick)  - If you have side effects from your medication that do not go away or are bothersome.     (Refer to the teaching your nurse gave you for side effects to call your doctor about)    - Common side effects may include:  stuffy nose, headache, feeling tired, muscle aches, upset stomach  - Before receiving any vaccines     - Call the [facility] at   If:  - You get sick, are on antibiotics, have had a recent vaccine, have surgery or dental work and your doctor wants your visit rescheduled.  - You need to cancel and reschedule your visit for any reason. Call at least 2 days before your visit if you need to cancel.   - Your insurance changes before your next visit.    (We will need to get approval from your new insurance. This can take up to two weeks.)     The [facility] is opened Monday thru Friday. We are closed on weekends and holidays.   Voice mail will take your call if the center is closed.  If you leave a message please allow 24 hours for a call back during weekdays. If you leave a message on a weekend/holiday, we will call you back the next business day.    A pharmacist is available Monday - Friday from 8:30AM to 3:30PM to help answer any questions you may have about your prescriptions(s). Please call pharmacy at:    [facility]: [phone]  [facility]: [phone]  [facility]: [phone]

## 2025-06-25 NOTE — PROGRESS NOTES
[facility]   Infusion Clinic Note   Date: 2025   Name: Ethel Deleon  : 1995   MRN: [id number]         Reason for Visit: OP Infusion (600 mg Inflectra Infusion)         Today: We administered inFLIXimab-dyyb (Inflectra) 600 mg in sodium chloride 0.9% 250 mL IV.       Ordered By: Dimple Cedeño MD       For a Diagnosis of: Crohn's disease of both small and large intestine without complication (Multi)       At today's visit patient accompanied by: Self      Today's Vitals:   Vitals:    25 0805   BP: 117/69   Pulse: 68   Resp: 16   SpO2: 98%   Weight: 63.2 kg (139 lb 5.3 oz)             Pre - Treatment Checklist:      - Previous reaction to current treatment: no      (Assess patient for the concerns below. Document provider notification as appropriate).  - Active or recent infection with/without current antibiotic use: no  - Recent or planned invasive dental work: no  - Recent or planned surgeries: no  - Recently received or plans to receive vaccinations: no  - Has treatment related toxicities: no  - Any chance may be pregnant:  no      Pain: 0   - Is the pain different from normal: no   - Is prescribing Doctor aware:  n/a      Labs: Reviewed       Fall Risk Screening: Tracey Fall Risk  History of Falling, Immediate or Within 3 Months: No  Secondary Diagnosis: No  Ambulatory Aid: Walks without aid/bedrest/nurse assist  Intravenous Therapy/Heparin Lock: Yes  Gait/Transferring: Normal/bedrest/immobile  Mental Status: Oriented to own ability  Tracey Fall Risk Score: 20       Review Of Systems:  Review of Systems   Constitutional:  Negative for appetite change, chills, fatigue, fever and unexpected weight change.   HENT:   Negative for hearing loss, mouth sores, sore throat, tinnitus, trouble swallowing and voice change.    Eyes:  Negative for eye problems.   Respiratory:  Negative for cough, shortness of breath and wheezing.    Cardiovascular:  Negative for chest pain,  "leg swelling and palpitations.   Gastrointestinal:  Negative for abdominal pain, blood in stool, constipation, diarrhea, nausea and vomiting.   Genitourinary:  Negative for dysuria, frequency and hematuria.    Musculoskeletal:  Negative for arthralgias and myalgias.   Skin:  Negative for itching, rash and wound.   Neurological:  Negative for dizziness, extremity weakness, headaches, light-headedness and numbness.   Hematological:  Does not bruise/bleed easily.         Infusion Readiness:  - Assessment Concerns Related to Infusion: No  - Provider notified: n/a      New Patient Education:    N/A (returning patient for continuation of therapy. Ongoing education provided as needed.)        Treatment Conditions & Drug Specific Questions:    InFLIXimab  (AVSOLA, INFLECTRA, REMICADE, RENFLEXIS)    (Unless otherwise specified on patient specific therapy plan):     TREATMENT CONDITIONS:  Unless otherwise specified on patient specific therapy plan HOLD and notify Provider prior to proceeding with today's infusion if patient with:  o Positive T-Spot  o Reactive Hep B sAg and/or Hep B Core Antibody    Lab Results   Component Value Date    TBSIN Negative 06/04/2025      Lab Results   Component Value Date    HEPBSAG NONREACTIVE 06/28/2023      No results found for: \"NONUHFIRE\", \"NONUHSWGH\", \"NONUHFISH\"  Lab Results   Component Value Date    HEPBCAB NONREACTIVE 10/30/2020     Lab Results   Component Value Date    HEPBCAB NONREACTIVE 10/30/2020        Patient meets treatment conditions? Yes    DRUG SPECIFIC QUESTIONS:    Any new or worsening signs / symptoms of heart failure which may include things like worsening shortness of breath, swelling, fatigue? No   (If yes notify provider before proceeding with today's infusion. New onset or worsening HF have been reported with infliximab)    REMINDER:   WEIGHT BASED DRUG     Recommended Vitals/Observation:  Vitals:     Induction: Obtain vital signs every 30 minutes; at end of observation " period and as needed.     Maintenance: Obtain vital signs at start and end of infusions  Observation:     Induction: Patient is monitored for 30 minutes post-infusion      Maintenance: No observation.        Weight Based Drug Calculations:    WEIGHT BASED DRUGS: Infliximab (REMICADE, INFLECTRA, RENFLEXIS)   Patient's dosing weight (kg): 63.7     10% weight variance for prescribed treatment: 55.33 kg to 70.07 kg     Patient's weight today:   Vitals:    06/25/25 0805   Weight: 63.2 kg (139 lb 5.3 oz)         weight range for prescribed dose:     Patient weight today falls outside of 10% variance or  weight range: No     Home [facility] pharmacist informed of weight variance: Not applicable    Doses that are weight based have an acceptable variance rule within 10% of the prescribed   order and/or within  weight range. If patient weight on day of infusion falls   outside of the 10% variance, or weight range, infusion is administered and   pharmacy contacted regarding future dosing adjustments, per policy.      Post Treatment: Patient tolerated treatment without issue and was discharged in no apparent distress.      Note Authored / Patient Cared for By: Tenisha Perea RN

## 2025-07-23 DIAGNOSIS — F90.0 ATTENTION DEFICIT HYPERACTIVITY DISORDER (ADHD), PREDOMINANTLY INATTENTIVE TYPE: ICD-10-CM

## 2025-07-23 DIAGNOSIS — K50.80 CROHN'S DISEASE OF BOTH SMALL AND LARGE INTESTINE WITHOUT COMPLICATION (MULTI): Primary | ICD-10-CM

## 2025-07-23 RX ORDER — METHYLPHENIDATE HYDROCHLORIDE 10 MG/1
10 CAPSULE, EXTENDED RELEASE ORAL EVERY MORNING
Qty: 30 CAPSULE | Refills: 0 | Status: SHIPPED | OUTPATIENT
Start: 2025-08-22

## 2025-07-23 RX ORDER — SODIUM, POTASSIUM,MAG SULFATES 17.5-3.13G
1 SOLUTION, RECONSTITUTED, ORAL ORAL 2 TIMES DAILY
Qty: 1 EACH | Refills: 0 | Status: SHIPPED | OUTPATIENT
Start: 2025-07-23 | End: 2025-07-24

## 2025-07-23 RX ORDER — METHYLPHENIDATE HYDROCHLORIDE 10 MG/1
10 CAPSULE, EXTENDED RELEASE ORAL EVERY MORNING
Qty: 30 CAPSULE | Refills: 0 | Status: SHIPPED | OUTPATIENT
Start: 2025-07-23

## 2025-07-23 RX ORDER — METHYLPHENIDATE HYDROCHLORIDE 10 MG/1
10 CAPSULE, EXTENDED RELEASE ORAL EVERY MORNING
Qty: 30 CAPSULE | Refills: 0 | Status: SHIPPED | OUTPATIENT
Start: 2025-09-20

## 2025-08-14 ENCOUNTER — ANESTHESIA EVENT (OUTPATIENT)
Dept: GASTROENTEROLOGY | Facility: EXTERNAL LOCATION | Age: 30
End: 2025-08-14

## 2025-08-20 ENCOUNTER — APPOINTMENT (OUTPATIENT)
Dept: INFUSION THERAPY | Facility: CLINIC | Age: 30
End: 2025-08-20
Payer: MEDICAID

## 2025-08-20 VITALS
OXYGEN SATURATION: 99 % | TEMPERATURE: 96.8 F | HEART RATE: 66 BPM | SYSTOLIC BLOOD PRESSURE: 114 MMHG | RESPIRATION RATE: 16 BRPM | DIASTOLIC BLOOD PRESSURE: 70 MMHG

## 2025-08-20 DIAGNOSIS — K50.80 CROHN'S DISEASE OF BOTH SMALL AND LARGE INTESTINE WITHOUT COMPLICATION (MULTI): ICD-10-CM

## 2025-08-20 PROCEDURE — 96413 CHEMO IV INFUSION 1 HR: CPT | Performed by: NURSE PRACTITIONER

## 2025-08-20 PROCEDURE — 96415 CHEMO IV INFUSION ADDL HR: CPT | Performed by: NURSE PRACTITIONER

## 2025-08-20 RX ORDER — ALBUTEROL SULFATE 0.83 MG/ML
3 SOLUTION RESPIRATORY (INHALATION) AS NEEDED
OUTPATIENT
Start: 2025-10-15

## 2025-08-20 RX ORDER — DIPHENHYDRAMINE HYDROCHLORIDE 50 MG/ML
50 INJECTION, SOLUTION INTRAMUSCULAR; INTRAVENOUS AS NEEDED
OUTPATIENT
Start: 2025-10-15

## 2025-08-20 RX ORDER — EPINEPHRINE 0.3 MG/.3ML
0.3 INJECTION SUBCUTANEOUS EVERY 5 MIN PRN
OUTPATIENT
Start: 2025-10-15

## 2025-08-20 RX ORDER — FAMOTIDINE 10 MG/ML
20 INJECTION, SOLUTION INTRAVENOUS ONCE AS NEEDED
OUTPATIENT
Start: 2025-10-15

## 2025-08-20 ASSESSMENT — ENCOUNTER SYMPTOMS
BRUISES/BLEEDS EASILY: 0
PALPITATIONS: 0
ARTHRALGIAS: 0
SHORTNESS OF BREATH: 0
VOMITING: 0
WHEEZING: 0
CONSTIPATION: 0
WOUND: 0
CHILLS: 0
FEVER: 0
ABDOMINAL PAIN: 0
VOICE CHANGE: 0
DIARRHEA: 0
UNEXPECTED WEIGHT CHANGE: 0
HEMATURIA: 0
FREQUENCY: 0
EYE PROBLEMS: 0
TROUBLE SWALLOWING: 0
BLOOD IN STOOL: 1
NUMBNESS: 0
HEADACHES: 0
NAUSEA: 0
FATIGUE: 0
LEG SWELLING: 0
DYSURIA: 0
APPETITE CHANGE: 0
EXTREMITY WEAKNESS: 0
SORE THROAT: 0
LIGHT-HEADEDNESS: 0
COUGH: 0
MYALGIAS: 0
DIZZINESS: 0

## 2025-08-20 ASSESSMENT — PAIN SCALES - GENERAL: PAINLEVEL_OUTOF10: 0-NO PAIN

## 2025-08-22 ENCOUNTER — ANESTHESIA (OUTPATIENT)
Dept: GASTROENTEROLOGY | Facility: EXTERNAL LOCATION | Age: 30
End: 2025-08-22

## 2025-08-22 ENCOUNTER — PATIENT MESSAGE (OUTPATIENT)
Dept: PRIMARY CARE | Facility: CLINIC | Age: 30
End: 2025-08-22

## 2025-08-22 ENCOUNTER — APPOINTMENT (OUTPATIENT)
Dept: GASTROENTEROLOGY | Facility: EXTERNAL LOCATION | Age: 30
End: 2025-08-22
Payer: MEDICAID

## 2025-08-22 VITALS
WEIGHT: 130 LBS | DIASTOLIC BLOOD PRESSURE: 75 MMHG | OXYGEN SATURATION: 96 % | SYSTOLIC BLOOD PRESSURE: 112 MMHG | BODY MASS INDEX: 25.52 KG/M2 | HEART RATE: 65 BPM | HEIGHT: 60 IN | TEMPERATURE: 96.8 F | RESPIRATION RATE: 12 BRPM

## 2025-08-22 DIAGNOSIS — K50.80 CROHN'S DISEASE OF BOTH SMALL AND LARGE INTESTINE WITHOUT COMPLICATION (MULTI): ICD-10-CM

## 2025-08-22 DIAGNOSIS — D84.9 IMMUNOCOMPROMISED: ICD-10-CM

## 2025-08-22 DIAGNOSIS — K50.80 CROHN'S DISEASE OF BOTH SMALL AND LARGE INTESTINE WITHOUT COMPLICATION (MULTI): Primary | ICD-10-CM

## 2025-08-22 LAB — PREGNANCY TEST URINE, POC: NEGATIVE

## 2025-08-22 PROCEDURE — 88305 TISSUE EXAM BY PATHOLOGIST: CPT | Performed by: STUDENT IN AN ORGANIZED HEALTH CARE EDUCATION/TRAINING PROGRAM

## 2025-08-22 PROCEDURE — 45380 COLONOSCOPY AND BIOPSY: CPT | Performed by: INTERNAL MEDICINE

## 2025-08-22 PROCEDURE — 88305 TISSUE EXAM BY PATHOLOGIST: CPT | Mod: TC,ELYLAB | Performed by: INTERNAL MEDICINE

## 2025-08-22 RX ORDER — SODIUM CHLORIDE 9 MG/ML
INJECTION, SOLUTION INTRAVENOUS CONTINUOUS PRN
Status: DISCONTINUED | OUTPATIENT
Start: 2025-08-22 | End: 2025-08-22

## 2025-08-22 RX ORDER — ONDANSETRON HYDROCHLORIDE 2 MG/ML
4 INJECTION, SOLUTION INTRAVENOUS ONCE AS NEEDED
Status: DISCONTINUED | OUTPATIENT
Start: 2025-08-22 | End: 2025-08-23 | Stop reason: HOSPADM

## 2025-08-22 RX ORDER — PROPOFOL 10 MG/ML
INJECTION, EMULSION INTRAVENOUS AS NEEDED
Status: DISCONTINUED | OUTPATIENT
Start: 2025-08-22 | End: 2025-08-22

## 2025-08-22 RX ORDER — LIDOCAINE HYDROCHLORIDE 20 MG/ML
INJECTION, SOLUTION INFILTRATION; PERINEURAL AS NEEDED
Status: DISCONTINUED | OUTPATIENT
Start: 2025-08-22 | End: 2025-08-22

## 2025-08-22 RX ADMIN — PROPOFOL 50 MG: 10 INJECTION, EMULSION INTRAVENOUS at 07:54

## 2025-08-22 RX ADMIN — LIDOCAINE HYDROCHLORIDE 2.5 ML: 20 INJECTION, SOLUTION INFILTRATION; PERINEURAL at 07:46

## 2025-08-22 RX ADMIN — PROPOFOL 100 MG: 10 INJECTION, EMULSION INTRAVENOUS at 07:46

## 2025-08-22 RX ADMIN — PROPOFOL 50 MG: 10 INJECTION, EMULSION INTRAVENOUS at 07:56

## 2025-08-22 RX ADMIN — SODIUM CHLORIDE: 9 INJECTION, SOLUTION INTRAVENOUS at 07:46

## 2025-08-22 RX ADMIN — PROPOFOL 50 MG: 10 INJECTION, EMULSION INTRAVENOUS at 07:51

## 2025-08-22 RX ADMIN — PROPOFOL 50 MG: 10 INJECTION, EMULSION INTRAVENOUS at 07:48

## 2025-08-22 SDOH — HEALTH STABILITY: MENTAL HEALTH: CURRENT SMOKER: 0

## 2025-08-22 ASSESSMENT — PAIN SCALES - GENERAL
PAINLEVEL_OUTOF10: 0 - NO PAIN
PAIN_LEVEL: 0
PAINLEVEL_OUTOF10: 0 - NO PAIN

## 2025-08-22 ASSESSMENT — PAIN - FUNCTIONAL ASSESSMENT
PAIN_FUNCTIONAL_ASSESSMENT: 0-10

## 2025-08-26 DIAGNOSIS — D50.8 OTHER IRON DEFICIENCY ANEMIAS: ICD-10-CM

## 2025-08-26 RX ORDER — FERROUS SULFATE 325(65) MG
1 TABLET ORAL DAILY
Qty: 90 TABLET | Refills: 0 | Status: SHIPPED | OUTPATIENT
Start: 2025-08-26

## 2025-08-29 LAB
LABORATORY COMMENT REPORT: NORMAL
PATH REPORT.FINAL DX SPEC: NORMAL
PATH REPORT.GROSS SPEC: NORMAL
PATH REPORT.RELEVANT HX SPEC: NORMAL
PATH REPORT.TOTAL CANCER: NORMAL

## 2025-10-15 ENCOUNTER — APPOINTMENT (OUTPATIENT)
Dept: INFUSION THERAPY | Facility: CLINIC | Age: 30
End: 2025-10-15
Payer: MEDICAID

## 2025-11-18 ENCOUNTER — APPOINTMENT (OUTPATIENT)
Dept: GASTROENTEROLOGY | Facility: CLINIC | Age: 30
End: 2025-11-18
Payer: MEDICAID

## 2026-06-03 ENCOUNTER — APPOINTMENT (OUTPATIENT)
Dept: PEDIATRIC NEUROLOGY | Facility: CLINIC | Age: 31
End: 2026-06-03
Payer: MEDICAID